# Patient Record
Sex: MALE | Race: WHITE | NOT HISPANIC OR LATINO
[De-identification: names, ages, dates, MRNs, and addresses within clinical notes are randomized per-mention and may not be internally consistent; named-entity substitution may affect disease eponyms.]

---

## 2017-01-31 ENCOUNTER — APPOINTMENT (OUTPATIENT)
Dept: ORTHOPEDIC SURGERY | Facility: CLINIC | Age: 82
End: 2017-01-31

## 2017-01-31 VITALS — BODY MASS INDEX: 29.62 KG/M2 | HEIGHT: 69 IN | WEIGHT: 200 LBS

## 2017-02-03 RX ORDER — HYALURONATE SODIUM 20 MG/2 ML
20 SYRINGE (ML) INTRAARTICULAR
Refills: 0 | Status: COMPLETED | OUTPATIENT
Start: 2017-02-03

## 2017-02-03 RX ADMIN — Medication 2 MG/2ML: at 00:00

## 2017-02-07 ENCOUNTER — APPOINTMENT (OUTPATIENT)
Dept: ORTHOPEDIC SURGERY | Facility: CLINIC | Age: 82
End: 2017-02-07

## 2017-02-07 VITALS — BODY MASS INDEX: 29.62 KG/M2 | WEIGHT: 200 LBS | HEIGHT: 69 IN

## 2017-02-10 RX ORDER — HYALURONATE SODIUM 20 MG/2 ML
20 SYRINGE (ML) INTRAARTICULAR
Refills: 0 | Status: COMPLETED | OUTPATIENT
Start: 2017-02-10

## 2017-02-10 RX ADMIN — Medication 2 MG/2ML: at 00:00

## 2017-02-15 ENCOUNTER — APPOINTMENT (OUTPATIENT)
Dept: ORTHOPEDIC SURGERY | Facility: CLINIC | Age: 82
End: 2017-02-15

## 2017-02-15 DIAGNOSIS — M12.811 OTHER SPECIFIC ARTHROPATHIES, NOT ELSEWHERE CLASSIFIED, RIGHT SHOULDER: ICD-10-CM

## 2017-02-15 RX ORDER — HYALURONATE SODIUM 20 MG/2 ML
20 SYRINGE (ML) INTRAARTICULAR
Refills: 0 | Status: COMPLETED | OUTPATIENT
Start: 2017-02-15

## 2017-02-15 RX ADMIN — Medication 2 MG/2ML: at 00:00

## 2017-03-20 ENCOUNTER — APPOINTMENT (OUTPATIENT)
Dept: ORTHOPEDIC SURGERY | Facility: CLINIC | Age: 82
End: 2017-03-20

## 2017-04-12 ENCOUNTER — APPOINTMENT (OUTPATIENT)
Dept: ORTHOPEDIC SURGERY | Facility: CLINIC | Age: 82
End: 2017-04-12

## 2017-04-12 VITALS
BODY MASS INDEX: 29.62 KG/M2 | DIASTOLIC BLOOD PRESSURE: 79 MMHG | SYSTOLIC BLOOD PRESSURE: 125 MMHG | HEIGHT: 69 IN | HEART RATE: 70 BPM | WEIGHT: 200 LBS

## 2017-04-18 RX ORDER — METHYLPRED ACET/NACL,ISO-OS/PF 40 MG/ML
40 VIAL (ML) INJECTION
Qty: 1 | Refills: 0 | Status: COMPLETED | OUTPATIENT
Start: 2017-04-18

## 2017-04-18 RX ORDER — LIDOCAINE HYDROCHLORIDE 10 MG/ML
1 INJECTION, SOLUTION INFILTRATION; PERINEURAL
Refills: 0 | Status: COMPLETED | OUTPATIENT
Start: 2017-04-18

## 2017-04-18 RX ADMIN — LIDOCAINE HYDROCHLORIDE %: 10 INJECTION, SOLUTION INFILTRATION; PERINEURAL at 00:00

## 2017-04-18 RX ADMIN — METHYLPREDNISOLONE ACETATE MG/ML: 40 INJECTION, SUSPENSION INTRA-ARTICULAR; INTRALESIONAL; INTRAMUSCULAR; SOFT TISSUE at 00:00

## 2017-09-06 ENCOUNTER — APPOINTMENT (OUTPATIENT)
Dept: ORTHOPEDIC SURGERY | Facility: CLINIC | Age: 82
End: 2017-09-06
Payer: MEDICARE

## 2017-09-06 DIAGNOSIS — M17.12 UNILATERAL PRIMARY OSTEOARTHRITIS, LEFT KNEE: ICD-10-CM

## 2017-09-06 DIAGNOSIS — M17.11 UNILATERAL PRIMARY OSTEOARTHRITIS, RIGHT KNEE: ICD-10-CM

## 2017-09-06 PROCEDURE — 20610 DRAIN/INJ JOINT/BURSA W/O US: CPT | Mod: 50

## 2017-09-06 PROCEDURE — 99214 OFFICE O/P EST MOD 30 MIN: CPT | Mod: 25

## 2017-09-06 RX ORDER — METHYLPRED ACET/NACL,ISO-OS/PF 40 MG/ML
40 VIAL (ML) INJECTION
Qty: 1 | Refills: 0 | Status: COMPLETED | OUTPATIENT
Start: 2017-09-06

## 2017-09-06 RX ORDER — LIDOCAINE HYDROCHLORIDE 10 MG/ML
1 INJECTION, SOLUTION INFILTRATION; PERINEURAL
Refills: 0 | Status: COMPLETED | OUTPATIENT
Start: 2017-09-06

## 2017-09-06 RX ADMIN — METHYLPREDNISOLONE ACETATE 2 MG/ML: 40 INJECTION, SUSPENSION INTRALESIONAL; INTRAMUSCULAR; INTRASYNOVIAL; SOFT TISSUE at 00:00

## 2017-09-06 RX ADMIN — LIDOCAINE HYDROCHLORIDE 3 %: 10 INJECTION, SOLUTION EPIDURAL; INFILTRATION; INTRACAUDAL; PERINEURAL at 00:00

## 2017-09-11 PROBLEM — M17.11 PRIMARY OSTEOARTHRITIS OF RIGHT KNEE: Status: ACTIVE | Noted: 2017-09-11

## 2019-02-05 ENCOUNTER — APPOINTMENT (OUTPATIENT)
Dept: ORTHOPEDIC SURGERY | Facility: CLINIC | Age: 84
End: 2019-02-05
Payer: MEDICARE

## 2019-02-05 VITALS
HEART RATE: 89 BPM | SYSTOLIC BLOOD PRESSURE: 138 MMHG | WEIGHT: 200 LBS | DIASTOLIC BLOOD PRESSURE: 79 MMHG | HEIGHT: 69 IN | BODY MASS INDEX: 29.62 KG/M2

## 2019-02-05 DIAGNOSIS — M70.21 OLECRANON BURSITIS, RIGHT ELBOW: ICD-10-CM

## 2019-02-05 PROCEDURE — 73080 X-RAY EXAM OF ELBOW: CPT | Mod: RT

## 2019-02-05 PROCEDURE — 99215 OFFICE O/P EST HI 40 MIN: CPT

## 2021-12-24 ENCOUNTER — EMERGENCY (EMERGENCY)
Facility: HOSPITAL | Age: 86
LOS: 1 days | Discharge: HOME | End: 2021-12-24
Attending: EMERGENCY MEDICINE
Payer: MEDICARE

## 2021-12-24 VITALS
HEIGHT: 68 IN | RESPIRATION RATE: 18 BRPM | SYSTOLIC BLOOD PRESSURE: 146 MMHG | OXYGEN SATURATION: 94 % | HEART RATE: 80 BPM | WEIGHT: 190.04 LBS | TEMPERATURE: 98 F | DIASTOLIC BLOOD PRESSURE: 84 MMHG

## 2021-12-24 PROCEDURE — 71045 X-RAY EXAM CHEST 1 VIEW: CPT | Mod: 26

## 2021-12-24 PROCEDURE — 99284 EMERGENCY DEPT VISIT MOD MDM: CPT | Mod: 25

## 2021-12-24 PROCEDURE — 70450 CT HEAD/BRAIN W/O DYE: CPT | Mod: MA

## 2021-12-24 PROCEDURE — 71045 X-RAY EXAM CHEST 1 VIEW: CPT

## 2021-12-24 PROCEDURE — 99284 EMERGENCY DEPT VISIT MOD MDM: CPT | Mod: GC

## 2021-12-24 PROCEDURE — 72170 X-RAY EXAM OF PELVIS: CPT

## 2021-12-24 PROCEDURE — 72125 CT NECK SPINE W/O DYE: CPT | Mod: 26,MA

## 2021-12-24 PROCEDURE — 70450 CT HEAD/BRAIN W/O DYE: CPT | Mod: 26,MA

## 2021-12-24 PROCEDURE — 72170 X-RAY EXAM OF PELVIS: CPT | Mod: 26

## 2021-12-24 PROCEDURE — 72125 CT NECK SPINE W/O DYE: CPT | Mod: MA

## 2021-12-24 NOTE — ED PROVIDER NOTE - NS ED ROS FT
Review of Systems    Constitutional: (-) fever, (-) chills, (-) fatigue  Cardiovascular: (-) chest pain, (-) syncope  Respiratory: (-) cough, (-) shortness of breath  Gastrointestinal: (-) vomiting, (-) diarrhea, (-) abdominal pain  Musculoskeletal: (-) neck pain, (-) back pain, (-) joint pain  Integumentary: (-) rash, (-) edema, (-) wound  Neurological: (+) headache, (-) altered mental status    Except as documented in the HPI, all other systems are negative.

## 2021-12-24 NOTE — ED PROVIDER NOTE - WET READ LAUNCH FT
Checked with [facility] and Dr. Nichol Cruz is not in network.  Patient will have to see in .     Thank you,  [facility] There are no Wet Read(s) to document. There is 1 Wet Read(s) to document.

## 2021-12-24 NOTE — ED ADULT NURSE NOTE - NSIMPLEMENTINTERV_GEN_ALL_ED
Implemented All Fall with Harm Risk Interventions:  [place] to call system. Call bell, personal items and telephone within reach. Instruct patient to call for assistance. Room bathroom lighting operational. Non-slip footwear when patient is off stretcher. Physically safe environment: no spills, clutter or unnecessary equipment. Stretcher in lowest position, wheels locked, appropriate side rails in place. Provide visual cue, wrist band, yellow gown, etc. Monitor gait and stability. Monitor for mental status changes and reorient to person, place, and time. Review medications for side effects contributing to fall risk. Reinforce activity limits and safety measures with patient and family. Provide visual clues: red socks.

## 2021-12-24 NOTE — ED ADULT NURSE NOTE - OBJECTIVE STATEMENT
89 y old male with PMH of hypothyroidism, glaucoma and HTN presents to the ED from assisted living s/p mechanical trip and fall. Pt reports he was wa 89 y old male with PMH of hypothyroidism, glaucoma and HTN presents to the ED from assisted living s/p mechanical trip and fall. Pt reports he was walking backwards  out of bathroom when he tripped and fell. Pt reports he hit his head but denies LOC. Pt denies use of AC. Denies HA, fevers, chills, CP, SOB, abd pain, n/v/d, weakness. Pt A&O x 4, ambulatory at baseline. Respirations spontaneous and unlabored. Abdomen soft, nontender and nondistended. Peripheral pulses strong. Skin warm, dry and intact. Bed in lowest position, side rails up and call bell in reach.

## 2021-12-24 NOTE — ED PROVIDER NOTE - ATTENDING CONTRIBUTION TO CARE
Mechanical trip and fall earlier this evening, not on AC, neuro intact, no evidence of significant skull injury on exam, no other pain complaints or point tenderness, will obtain imaging for screening for trauma given age and if negative discharge with outpatient follow up.

## 2021-12-24 NOTE — ED PROVIDER NOTE - CARE PLAN
1 Principal Discharge DX:	Headache due to injury of head and neck  Secondary Diagnosis:	Accident due to mechanical fall without injury, initial encounter

## 2021-12-24 NOTE — ED PROVIDER NOTE - CLINICAL SUMMARY MEDICAL DECISION MAKING FREE TEXT BOX
Jaylin-PGY3: 89 year old male with PMH HTN, hypothyroidism, glaucoma presents with headache s/p fall prior to arrival. Pt states he was walking backwards attempting to get out of shower, slipped, and fell backwards hitting his head. Denies LOC. Pt states he did not attempt to ambulate afterwards. Denies any aspirin or AC use. Neuro exam nonfocal, FROM of extremities. Pt declines pain medication at this time. Will obtain imaging r/o fx r/o ICH with dispo pending results.

## 2021-12-24 NOTE — ED PROVIDER NOTE - PROGRESS NOTE DETAILS
Jaylin-PGY3: imaging showing no acute pathology. Will DC with PMD follow up and return precautions. Pending non-emergent transport.

## 2021-12-24 NOTE — ED ADULT TRIAGE NOTE - ARRIVAL FROM
the [facility] cutter [facility] living [facility]

## 2021-12-24 NOTE — ED PROVIDER NOTE - PATIENT PORTAL LINK FT
You can access the FollowMyHealth Patient Portal offered by [facility] by registering at the following website: [email or web address]. By joining [employer]’s FollowMyHealth portal, you will also be able to view your health information using other applications (apps) compatible with our system.

## 2021-12-24 NOTE — ED PROVIDER NOTE - OBJECTIVE STATEMENT
89 year old male with PMH HTN, hypothyroidism, glaucoma presents with headache s/p fall prior to arrival. Pt states he was walking backwards attempting to get out of shower, slipped, and fell backwards hitting his head. Denies LOC. Pt states he did not attempt to ambulate afterwards. Denies any aspirin or AC use. Denies any fevers, chest pain, shortness of breath, abdominal pain, vomiting, vision change, numbness, weakness, or rash. Denies any additional complaints.

## 2021-12-24 NOTE — ED PROVIDER NOTE - PHYSICAL EXAMINATION
CONSTITUTIONAL: non-toxic, well appearing  SKIN: no rash, no petechiae.  EYES: PERRL, EOMI, pink conjunctiva, anicteric  ENT: tongue and uvular midline, no exudates, moist mucous membranes  NECK: Supple; no meningismus, no JVD  CARD: RRR, no murmurs, equal radial pulses bilaterally 2+  RESP: CTAB, no respiratory distress  ABD: Soft, non-tender, non-distended, no peritoneal signs  SPINE: no midline bony tenderness, pelvis stable  EXT: Normal ROM x4. No edema.   NEURO: Alert, oriented. Neuro exam nonfocal, equal strength bilaterally  PSYCH: Cooperative, appropriate.

## 2021-12-24 NOTE — ED PROVIDER NOTE - NSFOLLOWUPINSTRUCTIONS_ED_ALL_ED_FT
Rest and stay well hydrated.    Follow up with your primary care physician in 1-3 days.     Take over the counter acetaminophen (Tylenol) 650-1000 mg every 4-6 hours as needed for pain. Do not take more than 3000 mg in a 24 hour period. Be aware many over the counter and prescription medications also contain acetaminophen (Tylenol).     Return immediately with any new or worsening symptoms including fevers, chest pain, shortness of breath, numbness, weakness, or any additional concerns.

## 2021-12-25 VITALS
OXYGEN SATURATION: 94 % | DIASTOLIC BLOOD PRESSURE: 83 MMHG | TEMPERATURE: 98 F | RESPIRATION RATE: 17 BRPM | HEART RATE: 80 BPM | SYSTOLIC BLOOD PRESSURE: 155 MMHG

## 2022-03-22 ENCOUNTER — INPATIENT (INPATIENT)
Facility: HOSPITAL | Age: 87
LOS: 4 days | Discharge: NURSING, REHAB OR LONG-TERM CARE | DRG: 310 | End: 2022-03-27
Attending: HOSPITALIST | Admitting: STUDENT IN AN ORGANIZED HEALTH CARE EDUCATION/TRAINING PROGRAM
Payer: MEDICARE

## 2022-03-22 VITALS
DIASTOLIC BLOOD PRESSURE: 72 MMHG | RESPIRATION RATE: 18 BRPM | TEMPERATURE: 98 F | WEIGHT: 199.96 LBS | HEIGHT: 68 IN | HEART RATE: 80 BPM | SYSTOLIC BLOOD PRESSURE: 107 MMHG | OXYGEN SATURATION: 94 %

## 2022-03-22 LAB
ALBUMIN SERPL ELPH-MCNC: 4.3 G/DL — SIGNIFICANT CHANGE UP (ref 3.3–5)
ALP SERPL-CCNC: 69 U/L — SIGNIFICANT CHANGE UP (ref 40–120)
ALT FLD-CCNC: 13 U/L — SIGNIFICANT CHANGE UP (ref 10–45)
ANION GAP SERPL CALC-SCNC: 12 MMOL/L — SIGNIFICANT CHANGE UP (ref 5–17)
APTT BLD: 26.5 SEC — LOW (ref 27.5–35.5)
AST SERPL-CCNC: 17 U/L — SIGNIFICANT CHANGE UP (ref 10–40)
BASE EXCESS BLDV CALC-SCNC: 2 MMOL/L — SIGNIFICANT CHANGE UP (ref -2–2)
BASOPHILS # BLD AUTO: 0.03 K/UL — SIGNIFICANT CHANGE UP (ref 0–0.2)
BASOPHILS NFR BLD AUTO: 0.3 % — SIGNIFICANT CHANGE UP (ref 0–2)
BILIRUB SERPL-MCNC: 0.4 MG/DL — SIGNIFICANT CHANGE UP (ref 0.2–1.2)
BLOOD GAS VENOUS - CREATININE: SIGNIFICANT CHANGE UP MG/DL (ref 0.5–1.3)
BUN SERPL-MCNC: 21 MG/DL — SIGNIFICANT CHANGE UP (ref 7–23)
CA-I SERPL-SCNC: 1.2 MMOL/L — SIGNIFICANT CHANGE UP (ref 1.15–1.33)
CALCIUM SERPL-MCNC: 9.1 MG/DL — SIGNIFICANT CHANGE UP (ref 8.4–10.5)
CHLORIDE BLDV-SCNC: 102 MMOL/L — SIGNIFICANT CHANGE UP (ref 96–108)
CHLORIDE SERPL-SCNC: 104 MMOL/L — SIGNIFICANT CHANGE UP (ref 96–108)
CO2 BLDV-SCNC: 29 MMOL/L — HIGH (ref 22–26)
CO2 SERPL-SCNC: 25 MMOL/L — SIGNIFICANT CHANGE UP (ref 22–31)
CREAT SERPL-MCNC: 0.93 MG/DL — SIGNIFICANT CHANGE UP (ref 0.5–1.3)
EGFR: 78 ML/MIN/1.73M2 — SIGNIFICANT CHANGE UP
EOSINOPHIL # BLD AUTO: 0.19 K/UL — SIGNIFICANT CHANGE UP (ref 0–0.5)
EOSINOPHIL NFR BLD AUTO: 1.9 % — SIGNIFICANT CHANGE UP (ref 0–6)
GAS PNL BLDV: 137 MMOL/L — SIGNIFICANT CHANGE UP (ref 136–145)
GAS PNL BLDV: SIGNIFICANT CHANGE UP
GAS PNL BLDV: SIGNIFICANT CHANGE UP
GLUCOSE BLDV-MCNC: 138 MG/DL — HIGH (ref 70–99)
GLUCOSE SERPL-MCNC: 142 MG/DL — HIGH (ref 70–99)
HCO3 BLDV-SCNC: 28 MMOL/L — SIGNIFICANT CHANGE UP (ref 22–29)
HCT VFR BLD CALC: 40 % — SIGNIFICANT CHANGE UP (ref 39–50)
HCT VFR BLDA CALC: 41 % — SIGNIFICANT CHANGE UP (ref 39–51)
HGB BLD CALC-MCNC: 13.8 G/DL — SIGNIFICANT CHANGE UP (ref 12.6–17.4)
HGB BLD-MCNC: 13.4 G/DL — SIGNIFICANT CHANGE UP (ref 13–17)
IMM GRANULOCYTES NFR BLD AUTO: 0.6 % — SIGNIFICANT CHANGE UP (ref 0–1.5)
INR BLD: 1.04 RATIO — SIGNIFICANT CHANGE UP (ref 0.88–1.16)
LACTATE BLDV-MCNC: 2 MMOL/L — SIGNIFICANT CHANGE UP (ref 0.7–2)
LIDOCAIN IGE QN: 64 U/L — HIGH (ref 7–60)
LYMPHOCYTES # BLD AUTO: 1.07 K/UL — SIGNIFICANT CHANGE UP (ref 1–3.3)
LYMPHOCYTES # BLD AUTO: 10.6 % — LOW (ref 13–44)
MCHC RBC-ENTMCNC: 29.4 PG — SIGNIFICANT CHANGE UP (ref 27–34)
MCHC RBC-ENTMCNC: 33.5 GM/DL — SIGNIFICANT CHANGE UP (ref 32–36)
MCV RBC AUTO: 87.7 FL — SIGNIFICANT CHANGE UP (ref 80–100)
MONOCYTES # BLD AUTO: 0.59 K/UL — SIGNIFICANT CHANGE UP (ref 0–0.9)
MONOCYTES NFR BLD AUTO: 5.9 % — SIGNIFICANT CHANGE UP (ref 2–14)
NEUTROPHILS # BLD AUTO: 8.14 K/UL — HIGH (ref 1.8–7.4)
NEUTROPHILS NFR BLD AUTO: 80.7 % — HIGH (ref 43–77)
NRBC # BLD: 0 /100 WBCS — SIGNIFICANT CHANGE UP (ref 0–0)
PCO2 BLDV: 47 MMHG — SIGNIFICANT CHANGE UP (ref 42–55)
PH BLDV: 7.38 — SIGNIFICANT CHANGE UP (ref 7.32–7.43)
PLATELET # BLD AUTO: 181 K/UL — SIGNIFICANT CHANGE UP (ref 150–400)
PO2 BLDV: 42 MMHG — SIGNIFICANT CHANGE UP (ref 25–45)
POTASSIUM BLDV-SCNC: 3.9 MMOL/L — SIGNIFICANT CHANGE UP (ref 3.5–5.1)
POTASSIUM SERPL-MCNC: 4 MMOL/L — SIGNIFICANT CHANGE UP (ref 3.5–5.3)
POTASSIUM SERPL-SCNC: 4 MMOL/L — SIGNIFICANT CHANGE UP (ref 3.5–5.3)
PROT SERPL-MCNC: 6.4 G/DL — SIGNIFICANT CHANGE UP (ref 6–8.3)
PROTHROM AB SERPL-ACNC: 12 SEC — SIGNIFICANT CHANGE UP (ref 10.5–13.4)
RBC # BLD: 4.56 M/UL — SIGNIFICANT CHANGE UP (ref 4.2–5.8)
RBC # FLD: 15.3 % — HIGH (ref 10.3–14.5)
SAO2 % BLDV: 71 % — SIGNIFICANT CHANGE UP (ref 67–88)
SODIUM SERPL-SCNC: 141 MMOL/L — SIGNIFICANT CHANGE UP (ref 135–145)
TROPONIN T, HIGH SENSITIVITY RESULT: 28 NG/L — SIGNIFICANT CHANGE UP (ref 0–51)
WBC # BLD: 10.08 K/UL — SIGNIFICANT CHANGE UP (ref 3.8–10.5)
WBC # FLD AUTO: 10.08 K/UL — SIGNIFICANT CHANGE UP (ref 3.8–10.5)

## 2022-03-22 PROCEDURE — 73060 X-RAY EXAM OF HUMERUS: CPT | Mod: 26,LT

## 2022-03-22 PROCEDURE — 73030 X-RAY EXAM OF SHOULDER: CPT | Mod: 26,LT

## 2022-03-22 PROCEDURE — 73551 X-RAY EXAM OF FEMUR 1: CPT | Mod: 26,LT

## 2022-03-22 PROCEDURE — 73070 X-RAY EXAM OF ELBOW: CPT | Mod: 26,LT

## 2022-03-22 PROCEDURE — 73090 X-RAY EXAM OF FOREARM: CPT | Mod: 26,LT

## 2022-03-22 PROCEDURE — 71045 X-RAY EXAM CHEST 1 VIEW: CPT | Mod: 26

## 2022-03-22 PROCEDURE — 73502 X-RAY EXAM HIP UNI 2-3 VIEWS: CPT | Mod: 26,LT

## 2022-03-22 PROCEDURE — 99285 EMERGENCY DEPT VISIT HI MDM: CPT | Mod: GC

## 2022-03-22 PROCEDURE — 93010 ELECTROCARDIOGRAM REPORT: CPT

## 2022-03-22 RX ORDER — TETANUS TOXOID, REDUCED DIPHTHERIA TOXOID AND ACELLULAR PERTUSSIS VACCINE, ADSORBED 5; 2.5; 8; 8; 2.5 [IU]/.5ML; [IU]/.5ML; UG/.5ML; UG/.5ML; UG/.5ML
0.5 SUSPENSION INTRAMUSCULAR ONCE
Refills: 0 | Status: COMPLETED | OUTPATIENT
Start: 2022-03-22 | End: 2022-03-22

## 2022-03-22 RX ORDER — SODIUM CHLORIDE 9 MG/ML
250 INJECTION INTRAMUSCULAR; INTRAVENOUS; SUBCUTANEOUS ONCE
Refills: 0 | Status: COMPLETED | OUTPATIENT
Start: 2022-03-22 | End: 2022-03-22

## 2022-03-22 RX ADMIN — TETANUS TOXOID, REDUCED DIPHTHERIA TOXOID AND ACELLULAR PERTUSSIS VACCINE, ADSORBED 0.5 MILLILITER(S): 5; 2.5; 8; 8; 2.5 SUSPENSION INTRAMUSCULAR at 22:39

## 2022-03-22 RX ADMIN — SODIUM CHLORIDE 250 MILLILITER(S): 9 INJECTION INTRAMUSCULAR; INTRAVENOUS; SUBCUTANEOUS at 22:10

## 2022-03-22 NOTE — ED ADULT NURSE NOTE - OBJECTIVE STATEMENT
89 y male presents from [facility] s/p mechanical fall while in bathroom. patient reports to using bathroom- went to clean himself- fell forward/ hit left side of head. not on ac- no loc. GCS 15. a&ox3- placed in cervical collar by ems. denies vision change, HA, n/v/d, cp, sob, fevers, chills, lightheadedness, dizziness. reports to left sided hip and shoulder pain- chronic LUE drift. full rom to b/l lower extremities with full sensation. abrasions noted to b/l knees and left shoulder. placed on cardiac monitor- a. fib in 70s noted. saturating 92-95% on RA; md vaca aware. seen by break coverage rn- report given to lubna irvin. vss. awaiting labs radiology and dispo.

## 2022-03-22 NOTE — ED PROVIDER NOTE - NS ED ROS FT
Gen: No F/C/NS  Head: +falls, +head trauma  Eyes: No changes in vision   Resp: No cough or trouble breathing  Cardiovascular: No chest pain or palpitation  Gastroenteric: No N/V, +diarrhea   :  No change in urine output, dysuria or hematuria   MS: +joint pain   Neuro: No headache   Skin: No new rash

## 2022-03-22 NOTE — ED ADULT NURSE NOTE - CHIEF COMPLAINT QUOTE
slipped and fell when getting off the toilet. pain on left side of body from fall. weaker on left [id number]. in c-collar from EMS   denies blood thinner. unsure of LOC

## 2022-03-22 NOTE — ED PROVIDER NOTE - ATTENDING CONTRIBUTION TO CARE
I, Johanne Saunders, performed a history and physical exam of the patient and discussed their management with the resident. I reviewed the resident's note and agree with the documented findings and plan of care except where otherwise noted in my note. I was present and available for all procedures.     89 yo M pmh bph, hld, htn, copd, hypothyroidism p/w mechanical fall with L sided HA and L shoulder and L hip pain. States he slipped on a bowel movement he had while. No preceding chest pain, sob, palpitations, dizziness. No AC.  in the field. Noted to be in afib on monitor (no prior history).     On exam, awake, alert, in c-collar. PERRL  airway patent  breath sounds equal bilaterally  rub noted on cardiac exam, regular rate, irregular rhythm  no midline c/t/l/s spine tenderness, no posterior back lesions   no gross deformities of b/l LE, no TTP of L hip  +DP pulses b/l   L shoulder with decreased ROM, no gross deformities  Tenderness to L elbow, able to flex and extend  no focal neuro deficits.  abdomen soft, lower abdominal tenderness to palpation with guarding  no anterior chest wall tenderness or lesions     Will get trauma survery to eval for acute injury but also will get EKG to eval for new afib as well as trop/cardiac momitor to eval for cardiac etiology of fall such as arrhythmia. CT head to eval for intracranial cause of fall or traumatic injury post fall. Dispo pending.     will get

## 2022-03-22 NOTE — ED ADULT NURSE NOTE - BRAND OF COVID-19 VACCINATION
Pt discharging home today, spouse to provide transportation home.    Home health arrangements complete with pt and spouse choice of [facility]. CM confirmed acceptance with [facility].    Pt has orders for a GI referral, CM called [facility], they will make the referral and schedule the GI apt for pt.    No additional CM needs or barriers for discharge.     12/02/19 1224   Final Note   Assessment Type Final Discharge Note   Anticipated Discharge Disposition Home-Health   What phone number can be called within the next 1-3 days to see how you are doing after discharge? [phone]   Hospital Follow Up  Appt(s) scheduled? Yes   Discharge plans and expectations educations in teach back method with documentation complete? Yes   Right Care Referral Info   Post Acute Recommendation Home-care      [employer] dose 1 and 2

## 2022-03-22 NOTE — ED PROVIDER NOTE - PHYSICAL EXAMINATION
Head: NCAT  EENT: No facial swelling, tenderness, or bruising.    Neck: Ccollar in place. No midline tenderness.    Chest: No abrasions/tenderness  Cardiac: RRR   Resp: Bilateral breath sounds, no tachypnea  Abd: Nontender, no rebound or guarding.  Pelvis: Stable  MSK: FROM all extremities. No tenderness  Skin: No calf tenderness, no LE edema, abrasions noted to BL knee and L elbow. FROM of all joints.

## 2022-03-22 NOTE — ED PROVIDER NOTE - CLINICAL SUMMARY MEDICAL DECISION MAKING FREE TEXT BOX
89yo M PMH HTN, glaucoma, COPD (baseline O2 90-92%), hypothyroidism presents after a fall. Plan to obtain trauma scan, labs, urine, reassess. New onset Afib on ECG will likely admit for cardiology f/u.

## 2022-03-22 NOTE — ED ADULT TRIAGE NOTE - CHIEF COMPLAINT QUOTE
slipped and fell when getting off the toilet. pain on left side of body from fall. weaker on left [id number]. in c-collar from EMS   denies blood thinner. unsure of LOC

## 2022-03-22 NOTE — ED PROVIDER NOTE - OBJECTIVE STATEMENT
89yo M PMH HTN, glaucoma, COPD (baseline O2 90-92%), hypothyroidism presents after a fall. States he had a BM 1 hr PTA, after which he slipped on the bathroom floor and fell forward. +Head trauma. Pain to L shoulder, L elbow, L hand and L hip. Denies LOC. Denies dizziness, lightheadedness, nausea, vomiting prior to event. Patient denies chest pain or discomfort, shortness of breath, diaphoresis.

## 2022-03-23 DIAGNOSIS — Z98.890 OTHER SPECIFIED POSTPROCEDURAL STATES: Chronic | ICD-10-CM
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
## 2022-03-23 DIAGNOSIS — Z29.9 ENCOUNTER FOR PROPHYLACTIC MEASURES, UNSPECIFIED: ICD-10-CM

## 2022-03-23 DIAGNOSIS — E78.5 HYPERLIPIDEMIA, UNSPECIFIED: ICD-10-CM

## 2022-03-23 DIAGNOSIS — I48.91 UNSPECIFIED ATRIAL FIBRILLATION: ICD-10-CM

## 2022-03-23 DIAGNOSIS — D49.89 NEOPLASM OF UNSPECIFIED BEHAVIOR OF OTHER SPECIFIED SITES: ICD-10-CM

## 2022-03-23 DIAGNOSIS — E03.9 HYPOTHYROIDISM, UNSPECIFIED: ICD-10-CM

## 2022-03-23 PROBLEM — H40.9 UNSPECIFIED GLAUCOMA: Chronic | Status: ACTIVE | Noted: 2021-12-24

## 2022-03-23 PROBLEM — I10 ESSENTIAL (PRIMARY) HYPERTENSION: Chronic | Status: ACTIVE | Noted: 2021-12-24

## 2022-03-23 LAB
APPEARANCE UR: CLEAR — SIGNIFICANT CHANGE UP
BILIRUB UR-MCNC: NEGATIVE — SIGNIFICANT CHANGE UP
COLOR SPEC: SIGNIFICANT CHANGE UP
DIFF PNL FLD: NEGATIVE — SIGNIFICANT CHANGE UP
GLUCOSE UR QL: NEGATIVE — SIGNIFICANT CHANGE UP
KETONES UR-MCNC: NEGATIVE — SIGNIFICANT CHANGE UP
LEUKOCYTE ESTERASE UR-ACNC: NEGATIVE — SIGNIFICANT CHANGE UP
NITRITE UR-MCNC: NEGATIVE — SIGNIFICANT CHANGE UP
PH UR: 6 — SIGNIFICANT CHANGE UP (ref 5–8)
PROT UR-MCNC: SIGNIFICANT CHANGE UP
SARS-COV-2 RNA SPEC QL NAA+PROBE: SIGNIFICANT CHANGE UP
SP GR SPEC: 1.05 — HIGH (ref 1.01–1.02)
TROPONIN T, HIGH SENSITIVITY RESULT: 29 NG/L — SIGNIFICANT CHANGE UP (ref 0–51)
UROBILINOGEN FLD QL: NEGATIVE — SIGNIFICANT CHANGE UP

## 2022-03-23 PROCEDURE — 72125 CT NECK SPINE W/O DYE: CPT | Mod: 26,MA

## 2022-03-23 PROCEDURE — 99223 1ST HOSP IP/OBS HIGH 75: CPT

## 2022-03-23 PROCEDURE — 74177 CT ABD & PELVIS W/CONTRAST: CPT | Mod: 26,MA

## 2022-03-23 PROCEDURE — 93306 TTE W/DOPPLER COMPLETE: CPT | Mod: 26

## 2022-03-23 PROCEDURE — 71260 CT THORAX DX C+: CPT | Mod: 26,MA

## 2022-03-23 PROCEDURE — 70450 CT HEAD/BRAIN W/O DYE: CPT | Mod: 26,MA

## 2022-03-23 RX ORDER — MIRABEGRON 50 MG/1
1 TABLET, EXTENDED RELEASE ORAL
Qty: 0 | Refills: 0 | DISCHARGE

## 2022-03-23 RX ORDER — FINASTERIDE 5 MG/1
5 TABLET, FILM COATED ORAL DAILY
Refills: 0 | Status: DISCONTINUED | OUTPATIENT
Start: 2022-03-23 | End: 2022-03-27

## 2022-03-23 RX ORDER — METOPROLOL TARTRATE 50 MG
0.5 TABLET ORAL
Qty: 0 | Refills: 0 | DISCHARGE

## 2022-03-23 RX ORDER — METOPROLOL TARTRATE 50 MG
25 TABLET ORAL
Refills: 0 | Status: DISCONTINUED | OUTPATIENT
Start: 2022-03-23 | End: 2022-03-27

## 2022-03-23 RX ORDER — TAMSULOSIN HYDROCHLORIDE 0.4 MG/1
0.4 CAPSULE ORAL AT BEDTIME
Refills: 0 | Status: DISCONTINUED | OUTPATIENT
Start: 2022-03-23 | End: 2022-03-27

## 2022-03-23 RX ORDER — ACETAMINOPHEN 500 MG
650 TABLET ORAL EVERY 6 HOURS
Refills: 0 | Status: DISCONTINUED | OUTPATIENT
Start: 2022-03-23 | End: 2022-03-27

## 2022-03-23 RX ORDER — ATORVASTATIN CALCIUM 80 MG/1
40 TABLET, FILM COATED ORAL AT BEDTIME
Refills: 0 | Status: DISCONTINUED | OUTPATIENT
Start: 2022-03-23 | End: 2022-03-27

## 2022-03-23 RX ORDER — LATANOPROST 0.05 MG/ML
1 SOLUTION/ DROPS OPHTHALMIC; TOPICAL AT BEDTIME
Refills: 0 | Status: DISCONTINUED | OUTPATIENT
Start: 2022-03-23 | End: 2022-03-27

## 2022-03-23 RX ORDER — BIMATOPROST 0.3 MG/ML
1 SOLUTION/ DROPS OPHTHALMIC
Qty: 0 | Refills: 0 | DISCHARGE

## 2022-03-23 RX ORDER — LEVOTHYROXINE SODIUM 125 MCG
100 TABLET ORAL DAILY
Refills: 0 | Status: DISCONTINUED | OUTPATIENT
Start: 2022-03-23 | End: 2022-03-27

## 2022-03-23 RX ORDER — ENOXAPARIN SODIUM 100 MG/ML
40 INJECTION SUBCUTANEOUS EVERY 24 HOURS
Refills: 0 | Status: DISCONTINUED | OUTPATIENT
Start: 2022-03-23 | End: 2022-03-27

## 2022-03-23 RX ADMIN — TAMSULOSIN HYDROCHLORIDE 0.4 MILLIGRAM(S): 0.4 CAPSULE ORAL at 23:23

## 2022-03-23 RX ADMIN — ATORVASTATIN CALCIUM 40 MILLIGRAM(S): 80 TABLET, FILM COATED ORAL at 21:52

## 2022-03-23 RX ADMIN — ENOXAPARIN SODIUM 40 MILLIGRAM(S): 100 INJECTION SUBCUTANEOUS at 13:44

## 2022-03-23 RX ADMIN — LATANOPROST 1 DROP(S): 0.05 SOLUTION/ DROPS OPHTHALMIC; TOPICAL at 21:53

## 2022-03-23 RX ADMIN — Medication 25 MILLIGRAM(S): at 17:16

## 2022-03-23 RX ADMIN — FINASTERIDE 5 MILLIGRAM(S): 5 TABLET, FILM COATED ORAL at 13:44

## 2022-03-23 NOTE — H&P ADULT - PROBLEM SELECTOR PLAN 1
-cw metop 25 bid, appears rate controlled  -monitor on tele  -TTE pending  -outpt cardiologist Panchito Concepcion ([facility]) [phone]  -discussed AC w/ daughter risks/benefits of bleeding vs CVA. She prefers to hold off on AC given age and prior issues w/ bleeding/bruising on ASA

## 2022-03-23 NOTE — H&P ADULT - NSHPPHYSICALEXAM_GEN_ALL_CORE
Vital Signs Last 24 Hrs  T(C): 36.6 (23 Mar 2022 10:55), Max: 37 (22 Mar 2022 21:55)  T(F): 97.8 (23 Mar 2022 10:55), Max: 98.6 (22 Mar 2022 21:55)  HR: 84 (23 Mar 2022 10:55) (78 - 91)  BP: 154/74 (23 Mar 2022 10:55) (107/72 - 158/88)  BP(mean): --  RR: 18 (23 Mar 2022 10:55) (18 - 20)  SpO2: 95% (23 Mar 2022 10:55) (92% - 97%)    PHYSICAL EXAM:  CONSTITUTIONAL: NAD, well-developed, well-groomed  EYES: PERRLA; conjunctiva and sclera clear +exopthalmus, +mild L eye discharge  ENMT: Moist oral mucosa, no pharyngeal injection or exudates; normal dentition  NECK: Supple, no palpable masses; no thyromegaly  RESPIRATORY: Normal respiratory effort; lungs are clear to auscultation bilaterally  CARDIOVASCULAR: irregular Regular rate and rhythm, normal S1 and S2, +systolic murmur; No lower extremity edema; Peripheral pulses are 2+ bilaterally  ABDOMEN: Nontender to palpation, normoactive bowel sounds, no rebound/guarding; No hepatosplenomegaly  MUSCULOSKELETAL:  Normal gait; no clubbing or cyanosis of digits; TTP over L hip  PSYCH: A+O to person, place; affect appropriate  NEUROLOGY: CN 2-12 are intact and symmetric; no gross sensory deficits   SKIN: No rashes; no palpable lesions

## 2022-03-23 NOTE — PATIENT PROFILE ADULT - FALL HARM RISK - HARM RISK INTERVENTIONS

## 2022-03-23 NOTE — H&P ADULT - ASSESSMENT
89M w/ PMHx of HTN, ?CAD, HLD, goiter s/p resection now on thyroid replacement presents from the [facility] after a fall. Patient reports yesterday evening he was getting up from the bathroom, slipped and fell. Sounds mechanical, but may have been provoked in setting of new onset afib.

## 2022-03-23 NOTE — H&P ADULT - NSHPREVIEWOFSYSTEMS_GEN_ALL_CORE
Review of Systems:   CONSTITUTIONAL: No fever, weight loss  EYES: No eye pain, visual disturbances, or discharge  ENMT:  No difficulty hearing, tinnitus, vertigo; No sinus or throat pain  RESPIRATORY: No SOB. No cough, wheezing, chills or hemoptysis  CARDIOVASCULAR: No chest pain, palpitations, dizziness, or leg swelling  GASTROINTESTINAL: No abdominal or epigastric pain. No nausea, vomiting, or hematemesis; No diarrhea or constipation. No melena or hematochezia.  GENITOURINARY: No dysuria, frequency, hematuria, or incontinence  NEUROLOGICAL: No headaches, memory loss, loss of strength, numbness, or tremors  SKIN: No itching, burning, rashes, or lesions   LYMPH NODES: No enlarged glands  ENDOCRINE: No heat or cold intolerance; No hair loss  MUSCULOSKELETAL: No muscle, back pain +L hip pain  PSYCHIATRIC: No depression, anxiety, mood swings, or difficulty sleeping  HEME/LYMPH: No easy bruising, or bleeding gums

## 2022-03-23 NOTE — H&P ADULT - NSHPLABSRESULTS_GEN_ALL_CORE
LABS:                         13.4   10.08 )-----------( 181      ( 22 Mar 2022 22:15 )             40.0         141  |  104  |  21  ----------------------------<  142<H>  4.0   |  25  |  0.93    Ca    9.1      22 Mar 2022 22:15    TPro  6.4  /  Alb  4.3  /  TBili  0.4  /  DBili  x   /  AST  17  /  ALT  13  /  AlkPhos  69      PT/INR - ( 22 Mar 2022 22:15 )   PT: 12.0 sec;   INR: 1.04 ratio         PTT - ( 22 Mar 2022 22:15 )  PTT:26.5 sec      Urinalysis Basic - ( 23 Mar 2022 04:28 )    Color: Light Yellow / Appearance: Clear / S.048 / pH: x  Gluc: x / Ketone: Negative  / Bili: Negative / Urobili: Negative   Blood: x / Protein: Trace / Nitrite: Negative   Leuk Esterase: Negative / RBC: x / WBC x   Sq Epi: x / Non Sq Epi: x / Bacteria: x            Records reviewed from prior hospitalization.  Labs reviewed remarkable for neg trop  EKG personally reviewed - afib  CXR personally reviewed - clear    < from: CT Abdomen and Pelvis w/ IV Cont (22 @ 00:22) >    FINDINGS:    CHEST:  LUNGS AND LARGE AIRWAYS: Patent central airways. Mild bibasilar dependent   atelectasis. Well defined low attenuation nodule measuring up to 2 cm at   right posterior cardiophrenic angle (62:2), likely bronchogenic cyst.  PLEURA: No pleural effusion or pneumothorax.  VESSELS: Normal aortic caliber. Atherosclerotic changes.  HEART: The heart is enlarged. No pericardial effusion. Coronary artery   calcifications.  MEDIASTINUM AND NABIL: There is aheterogeneously enhancing anterior   mediastinal mass with calcifications measuring approximately 6.7 x 5.4 x   6.0 cm.  CHEST WALL AND LOWER NECK: Within normal limits.    ABDOMEN AND PELVIS:  LIVER: Steatosis. Sub-cm right hepatic lobe calcification.  BILE DUCTS: Normal caliber.  GALLBLADDER: Within normal limits.  SPLEEN: Within normal limits.  PANCREAS: Within normal limits.  ADRENALS: Within normal limits.  KIDNEYS/URETERS: No hydronephrosis. Bilateral renal cysts and   subcentimeter hypodensities too small to characterize.    BLADDER: Within normal limits.  REPRODUCTIVE ORGANS: Prostate within normal limits.    BOWEL: No bowel obstruction. Appendix is normal.  PERITONEUM: No ascites.  VESSELS: Atherosclerotic changes.  RETROPERITONEUM/LYMPH NODES: No lymphadenopathy.  ABDOMINAL WALL: Large right and small left fat-containing inguinal   hernias.  BONES: Multilevel degenerative changes of the spine. Patchy osteopenia   limits detailed osseous evaluation. Mild indentation of the superior   endplate of T12, likely related to Schmorl's node.    IMPRESSION:    1.  No sequela of acute traumatic injury in the chest, abdomen or pelvis.  2.  Incompletely characterized large heterogeneously enhancing anterior   mediastinal mass. Source unclear. Correlate with prior studies and   consider further workup with PET/CT if indicated.    < end of copied text >

## 2022-03-23 NOTE — H&P ADULT - NSICDXPASTMEDICALHX_GEN_ALL_CORE_FT
PAST MEDICAL HISTORY:  Glaucoma     H/O goiter s/p thyroid resection    HTN (hypertension)     Hypothyroidism

## 2022-03-23 NOTE — ED ADULT NURSE REASSESSMENT NOTE - NS ED NURSE REASSESS COMMENT FT1
patient found sitting at edge of bed and urinated on floor. patient bedding changed and patient positioned for comfort. patient moved into hallway by nursing station for closer supervision

## 2022-03-23 NOTE — H&P ADULT - PROBLEM SELECTOR PLAN 3
-sounds mechanical, possibly provoked due to afib  -monitor on tele, TTE as above  -PT  -orthostatics

## 2022-03-23 NOTE — H&P ADULT - PROBLEM SELECTOR PLAN 2
-?related to prior goiter  -send tsh, FT4, AFP, BHCG, LDH  -dw daughter Anh Lozoya->thinks this may be known, would not want to pursue invasive w/u while inpatient  -outpt follow up w/ Dr. Gannon Head and Neck Surgery

## 2022-03-24 LAB
AFP-TM SERPL-MCNC: 3.4 NG/ML — SIGNIFICANT CHANGE UP
ANION GAP SERPL CALC-SCNC: 13 MMOL/L — SIGNIFICANT CHANGE UP (ref 5–17)
BUN SERPL-MCNC: 14 MG/DL — SIGNIFICANT CHANGE UP (ref 7–23)
CALCIUM SERPL-MCNC: 9.2 MG/DL — SIGNIFICANT CHANGE UP (ref 8.4–10.5)
CHLORIDE SERPL-SCNC: 103 MMOL/L — SIGNIFICANT CHANGE UP (ref 96–108)
CO2 SERPL-SCNC: 24 MMOL/L — SIGNIFICANT CHANGE UP (ref 22–31)
CREAT SERPL-MCNC: 0.85 MG/DL — SIGNIFICANT CHANGE UP (ref 0.5–1.3)
EGFR: 83 ML/MIN/1.73M2 — SIGNIFICANT CHANGE UP
GLUCOSE SERPL-MCNC: 115 MG/DL — HIGH (ref 70–99)
HCG-TM SERPL-ACNC: <1 MIU/ML — SIGNIFICANT CHANGE UP
HCT VFR BLD CALC: 42.6 % — SIGNIFICANT CHANGE UP (ref 39–50)
HGB BLD-MCNC: 14 G/DL — SIGNIFICANT CHANGE UP (ref 13–17)
LDH SERPL L TO P-CCNC: 163 U/L — SIGNIFICANT CHANGE UP (ref 50–242)
MCHC RBC-ENTMCNC: 28.9 PG — SIGNIFICANT CHANGE UP (ref 27–34)
MCHC RBC-ENTMCNC: 32.9 GM/DL — SIGNIFICANT CHANGE UP (ref 32–36)
MCV RBC AUTO: 87.8 FL — SIGNIFICANT CHANGE UP (ref 80–100)
NRBC # BLD: 0 /100 WBCS — SIGNIFICANT CHANGE UP (ref 0–0)
PLATELET # BLD AUTO: 174 K/UL — SIGNIFICANT CHANGE UP (ref 150–400)
POTASSIUM SERPL-MCNC: 3.9 MMOL/L — SIGNIFICANT CHANGE UP (ref 3.5–5.3)
POTASSIUM SERPL-SCNC: 3.9 MMOL/L — SIGNIFICANT CHANGE UP (ref 3.5–5.3)
RBC # BLD: 4.85 M/UL — SIGNIFICANT CHANGE UP (ref 4.2–5.8)
RBC # FLD: 15.3 % — HIGH (ref 10.3–14.5)
SODIUM SERPL-SCNC: 140 MMOL/L — SIGNIFICANT CHANGE UP (ref 135–145)
T4 FREE SERPL-MCNC: 1.2 NG/DL — SIGNIFICANT CHANGE UP (ref 0.9–1.8)
TSH SERPL-MCNC: 2.76 UIU/ML — SIGNIFICANT CHANGE UP (ref 0.27–4.2)
WBC # BLD: 7.34 K/UL — SIGNIFICANT CHANGE UP (ref 3.8–10.5)
WBC # FLD AUTO: 7.34 K/UL — SIGNIFICANT CHANGE UP (ref 3.8–10.5)

## 2022-03-24 PROCEDURE — 99233 SBSQ HOSP IP/OBS HIGH 50: CPT

## 2022-03-24 RX ADMIN — Medication 25 MILLIGRAM(S): at 06:00

## 2022-03-24 RX ADMIN — ENOXAPARIN SODIUM 40 MILLIGRAM(S): 100 INJECTION SUBCUTANEOUS at 15:57

## 2022-03-24 RX ADMIN — LATANOPROST 1 DROP(S): 0.05 SOLUTION/ DROPS OPHTHALMIC; TOPICAL at 21:59

## 2022-03-24 RX ADMIN — Medication 100 MICROGRAM(S): at 06:00

## 2022-03-24 RX ADMIN — FINASTERIDE 5 MILLIGRAM(S): 5 TABLET, FILM COATED ORAL at 12:06

## 2022-03-24 RX ADMIN — ATORVASTATIN CALCIUM 40 MILLIGRAM(S): 80 TABLET, FILM COATED ORAL at 21:54

## 2022-03-24 RX ADMIN — Medication 25 MILLIGRAM(S): at 17:39

## 2022-03-24 RX ADMIN — TAMSULOSIN HYDROCHLORIDE 0.4 MILLIGRAM(S): 0.4 CAPSULE ORAL at 21:54

## 2022-03-24 NOTE — PHYSICAL THERAPY INITIAL EVALUATION ADULT - PRECAUTIONS/LIMITATIONS, REHAB EVAL
He also co hip pain. States he was down ~1hr before he activated his life alert and was able to summon help. He was brought via EMS to hospital for further eval. 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 Additionally ECG showing new onset afib. He also co hip pain. States he was down ~1hr before he activated his life alert and was able to summon help. He was brought via EMS to hospital for further eval. 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 Additionally ECG showing new onset afib./fall precautions

## 2022-03-24 NOTE — PHYSICAL THERAPY INITIAL EVALUATION ADULT - PATIENT/FAMILY/SIGNIFICANT OTHER GOALS STATEMENT, PT EVAL
2300 Discontinued wrist restraints at this time. Will continue to monitor  the need to replace restraints. 0645 Uneventful night. 0700 Bedside and Verbal shift change report given to [id number] (oncoming nurse) by Cat Vincent (offgoing nurse). Report included the following information SBAR, Kardex, OR Summary, Intake/Output, MAR, Recent Results and Cardiac Rhythm NSR. to walk out of this place

## 2022-03-24 NOTE — PHYSICAL THERAPY INITIAL EVALUATION ADULT - GAIT DEVIATIONS NOTED, PT EVAL
decreased demarco/increased time in double stance/decreased velocity of limb motion/decreased step length/decreased stride length

## 2022-03-24 NOTE — PHYSICAL THERAPY INITIAL EVALUATION ADULT - PERTINENT HX OF CURRENT PROBLEM, REHAB EVAL
Pt is a 89M w/ PMH of HTN, ?CAD, HLD, goiter s/p resection now on thyroid replacement presents from the [facility] after a fall. Patient reports yesterday evening he was getting up from the bathroom, slipped and fell. +headstrike but denies LOC.

## 2022-03-25 ENCOUNTER — TRANSCRIPTION ENCOUNTER (OUTPATIENT)
Age: 87
End: 2022-03-25

## 2022-03-25 LAB
ANION GAP SERPL CALC-SCNC: 15 MMOL/L — SIGNIFICANT CHANGE UP (ref 5–17)
BUN SERPL-MCNC: 17 MG/DL — SIGNIFICANT CHANGE UP (ref 7–23)
CALCIUM SERPL-MCNC: 9 MG/DL — SIGNIFICANT CHANGE UP (ref 8.4–10.5)
CHLORIDE SERPL-SCNC: 103 MMOL/L — SIGNIFICANT CHANGE UP (ref 96–108)
CO2 SERPL-SCNC: 24 MMOL/L — SIGNIFICANT CHANGE UP (ref 22–31)
CREAT SERPL-MCNC: 0.82 MG/DL — SIGNIFICANT CHANGE UP (ref 0.5–1.3)
EGFR: 83 ML/MIN/1.73M2 — SIGNIFICANT CHANGE UP
GLUCOSE SERPL-MCNC: 110 MG/DL — HIGH (ref 70–99)
HCT VFR BLD CALC: 42.3 % — SIGNIFICANT CHANGE UP (ref 39–50)
HGB BLD-MCNC: 13.9 G/DL — SIGNIFICANT CHANGE UP (ref 13–17)
MCHC RBC-ENTMCNC: 28.5 PG — SIGNIFICANT CHANGE UP (ref 27–34)
MCHC RBC-ENTMCNC: 32.9 GM/DL — SIGNIFICANT CHANGE UP (ref 32–36)
MCV RBC AUTO: 86.7 FL — SIGNIFICANT CHANGE UP (ref 80–100)
NRBC # BLD: 0 /100 WBCS — SIGNIFICANT CHANGE UP (ref 0–0)
PLATELET # BLD AUTO: 169 K/UL — SIGNIFICANT CHANGE UP (ref 150–400)
POTASSIUM SERPL-MCNC: 3.9 MMOL/L — SIGNIFICANT CHANGE UP (ref 3.5–5.3)
POTASSIUM SERPL-SCNC: 3.9 MMOL/L — SIGNIFICANT CHANGE UP (ref 3.5–5.3)
RBC # BLD: 4.88 M/UL — SIGNIFICANT CHANGE UP (ref 4.2–5.8)
RBC # FLD: 15.2 % — HIGH (ref 10.3–14.5)
SARS-COV-2 RNA SPEC QL NAA+PROBE: SIGNIFICANT CHANGE UP
SODIUM SERPL-SCNC: 142 MMOL/L — SIGNIFICANT CHANGE UP (ref 135–145)
WBC # BLD: 6.97 K/UL — SIGNIFICANT CHANGE UP (ref 3.8–10.5)
WBC # FLD AUTO: 6.97 K/UL — SIGNIFICANT CHANGE UP (ref 3.8–10.5)

## 2022-03-25 PROCEDURE — 99232 SBSQ HOSP IP/OBS MODERATE 35: CPT

## 2022-03-25 RX ORDER — SODIUM CHLORIDE 0.65 %
1 AEROSOL, SPRAY (ML) NASAL
Refills: 0 | Status: DISCONTINUED | OUTPATIENT
Start: 2022-03-25 | End: 2022-03-27

## 2022-03-25 RX ADMIN — FINASTERIDE 5 MILLIGRAM(S): 5 TABLET, FILM COATED ORAL at 13:49

## 2022-03-25 RX ADMIN — Medication 25 MILLIGRAM(S): at 06:30

## 2022-03-25 RX ADMIN — Medication 100 MICROGRAM(S): at 06:30

## 2022-03-25 RX ADMIN — ENOXAPARIN SODIUM 40 MILLIGRAM(S): 100 INJECTION SUBCUTANEOUS at 13:49

## 2022-03-25 RX ADMIN — Medication 25 MILLIGRAM(S): at 18:57

## 2022-03-25 RX ADMIN — TAMSULOSIN HYDROCHLORIDE 0.4 MILLIGRAM(S): 0.4 CAPSULE ORAL at 21:31

## 2022-03-25 RX ADMIN — ATORVASTATIN CALCIUM 40 MILLIGRAM(S): 80 TABLET, FILM COATED ORAL at 21:31

## 2022-03-25 RX ADMIN — LATANOPROST 1 DROP(S): 0.05 SOLUTION/ DROPS OPHTHALMIC; TOPICAL at 21:31

## 2022-03-25 NOTE — DISCHARGE NOTE PROVIDER - NSDCCPCAREPLAN_GEN_ALL_CORE_FT
PRINCIPAL DISCHARGE DIAGNOSIS  Diagnosis: Fall  Assessment and Plan of Treatment: Had a mechanical fall, tripped on his legs  CT of head  and C- spine, xrays were nagative for acute pathology   PT recommended rehab for physical therapy   Fall precaution   please follow up with your primary care physician after rehab        SECONDARY DISCHARGE DIAGNOSES  Diagnosis: Anterior mediastinal tumor  Assessment and Plan of Treatment: Likely related to prior goiter  Discussed with your daughter who did not want to pursue invasive workup while inpatient   Please follow up with Dr. Gannon Head and Neck Surgery for further workup       Diagnosis: New onset atrial fibrillation  Assessment and Plan of Treatment: Found to have A. fib on tele   rate is controlled   Echo shows EF 55%, severe AS 0.5sq cm, TV gradient 55  Anticoagulation was discussed with your daughter and your cardiologist Dr. Panchito Concepcion ([facility]) [phone]    Decided not to start anticoagulation because you have fall risks and Hx of bleeding on aspirin   Continue Metoprolol as directed for rate control   Please follow up with your cardiologist    Diagnosis: Hypothyroidism  Assessment and Plan of Treatment: Continue Synthroid as directed

## 2022-03-25 NOTE — DISCHARGE NOTE PROVIDER - CARE PROVIDER_API CALL
DAVIS JEREZ  Internal Medicine  [address]  [place] [address]  Phone: [phone]  Fax: [phone]  Follow Up Time: 1 month    Dr. Gannon,   Head and Neck surgery  Phone: (   )    -  Fax: (   )    -  Follow Up Time:

## 2022-03-25 NOTE — DISCHARGE NOTE PROVIDER - HOSPITAL COURSE
89M w/ PMHx of HTN, ?CAD, HLD, goiter s/p resection now on thyroid replacement presents from the [facility] after a fall. Patient reports yesterday evening he was getting up from the bathroom, slipped and fell. Sounds mechanical, but may have been provoked in setting of new onset afib.  Had mechanical fall, tripped on his legs. New onset A. fib on tele  HR 70s-90s, Echo showed EF 55%, severe AS 0.5sq cm, TV gradient 55, also found to have anterior mediastinal tumor   Possibly related to prior goitor . A. fib and severe AS discussed with daughter and pt's cardiologist Dr. Panchito Concepcion. Decided not to start AC for fall risks and h/o bleeding on ASA  Daughter did not want any invasive workup while in patient . pt to follow up with his cardiologist and Head and Neck surgery Dr. Gannon  Discharge to [facility] This is a 89M w/ PMHx of HTN, ?CAD, HLD, goiter s/p resection now on thyroid replacement presents from the [facility] after a fall. Patient reports yesterday evening he was getting up from the bathroom, slipped and fell. Sounds mechanical, but may have been provoked in setting of new onset afib. He was admitted in Tele monitoring and plan of care was outlined as below-    1. Fall-   - Had a mechanical fall, tripped on his legs  - Tele- a.fib, HR 70-90s, not orthostatic, Echo shows EF 55%, severe AS 0.5sq cm, TV gradient 55    No AC per daughter for fall risks and h/o bleeding on ASA    his cardiologist Dr. Panchito Concepcion ([facility]) [phone] agrees  - PT evaluated and recommended [facility]  ** spoke to daughter the status and plan.    2. New onset atrial fibrillation-   - Rate controlled a.fib. Echo shows EF 55%, severe AS 0.5sq cm, TV gradient 55  - c/w metoprolol 25mg bid  - Spoke to his cardiologist Dr. Panchito Concepcion ([facility]) [phone]      He agreed with not to include AC  - per daughter he has fall risks and h/o bleeding on ASA.    3. Anterior mediastinal tumor-   - Likely related to prior goiter  - daughter thinks this may be known, would not want to pursue invasive w/u while inpatient  -outpt follow up w/ Dr. Gannon, a Head and Neck Surgeon.    - The patient remained hemodynamically stable and was discharged to Rehab.

## 2022-03-25 NOTE — DISCHARGE NOTE PROVIDER - NSDCMRMEDTOKEN_GEN_ALL_CORE_FT
atorvastatin 40 mg oral tablet: 1 tab(s) orally once a day  finasteride 5 mg oral tablet: 1 tab(s) orally once a day  Flomax 0.4 mg oral capsule: 1 cap(s) orally once a day  Lumigan 0.01% ophthalmic solution: 1 drop(s) to each affected eye once a day (in the evening)  metoprolol tartrate 25 mg oral tablet: 1 tab(s) orally 2 times a day  Myrbetriq 50 mg oral tablet, extended release: 1 tab(s) orally once a day  Synthroid 100 mcg (0.1 mg) oral tablet: 1 tab(s) orally once a day   acetaminophen 325 mg oral tablet: 2 tab(s) orally every 6 hours, As needed, Temp greater or equal to 38C (100.4F), Mild Pain (1 - 3)  atorvastatin 40 mg oral tablet: 1 tab(s) orally once a day (at bedtime)  finasteride 5 mg oral tablet: 1 tab(s) orally once a day  latanoprost 0.005% ophthalmic solution: 1 drop(s) to each affected eye once a day (at bedtime)  levothyroxine 100 mcg (0.1 mg) oral tablet: 1 tab(s) orally once a day  Lumigan 0.01% ophthalmic solution: 1 drop(s) to each affected eye once a day (in the evening)  metoprolol tartrate 25 mg oral tablet: 1 tab(s) orally 2 times a day  Myrbetriq 50 mg oral tablet, extended release: 1 tab(s) orally once a day  sodium chloride 0.65% nasal spray: 1 spray(s) nasal 3 times a day  tamsulosin 0.4 mg oral capsule: 1 cap(s) orally once a day (at bedtime)

## 2022-03-25 NOTE — PROGRESS NOTE ADULT - PROBLEM SELECTOR PLAN 1
Had a mechanical fall, tripped on his legs  - Tele- a.fib, HR 70-90s, not orthostatic, Echo shows EF 55%, severe AS 0.5sq cm, TV gradient 55    No AC per daughter for fall risks and h/o bleeding on ASA    his cardiologist Dr. Panchito Concepcion ([facility]) [phone] agrees  - PT evaluated and recommended YUN  - d/c to Rehab once ready  ** spoke to daughter the status and plan Had a mechanical fall, tripped on his legs  - Tele- a.fib, HR 70-90s, not orthostatic, Echo shows EF 55%, severe AS 0.5sq cm, TV gradient 55    No AC per daughter for fall risks and h/o bleeding on ASA    his cardiologist Dr. Panchito Concepcion ([facility]) [phone] agrees  - PT evaluated and recommended YUN  - d/c to Rehab once ready, spoke to CM and NP  ** spoke to daughter the status and plan

## 2022-03-25 NOTE — DISCHARGE NOTE PROVIDER - PROVIDER TOKENS
PROVIDER:[TOKEN:[[id number]],FOLLOWUP:[1 month]],FREE:[LAST:[Dr. Gannon],PHONE:[(   )    -],FAX:[(   )    -],ADDRESS:[[facility] Ne[facility] surgery]]

## 2022-03-26 PROCEDURE — 99232 SBSQ HOSP IP/OBS MODERATE 35: CPT

## 2022-03-26 RX ADMIN — Medication 100 MICROGRAM(S): at 06:04

## 2022-03-26 RX ADMIN — Medication 25 MILLIGRAM(S): at 18:10

## 2022-03-26 RX ADMIN — Medication 25 MILLIGRAM(S): at 06:04

## 2022-03-26 RX ADMIN — ENOXAPARIN SODIUM 40 MILLIGRAM(S): 100 INJECTION SUBCUTANEOUS at 15:34

## 2022-03-26 RX ADMIN — FINASTERIDE 5 MILLIGRAM(S): 5 TABLET, FILM COATED ORAL at 11:44

## 2022-03-26 NOTE — PROGRESS NOTE ADULT - PROBLEM SELECTOR PLAN 1
Had a mechanical fall, tripped on his legs  - Tele- a.fib, HR 70-90s, not orthostatic, Echo shows EF 55%, severe AS 0.5sq cm, TV gradient 55    No AC per daughter for fall risks and h/o bleeding on ASA    his cardiologist Dr. Panchito Concepcion ([facility]) [phone] agrees  - PT evaluated and recommended YUN  - d/c to Rehab once ready, spoke to CM and NP

## 2022-03-27 ENCOUNTER — TRANSCRIPTION ENCOUNTER (OUTPATIENT)
Age: 87
End: 2022-03-27

## 2022-03-27 VITALS — WEIGHT: 182.76 LBS

## 2022-03-27 PROCEDURE — 97161 PT EVAL LOW COMPLEX 20 MIN: CPT

## 2022-03-27 PROCEDURE — 36415 COLL VENOUS BLD VENIPUNCTURE: CPT

## 2022-03-27 PROCEDURE — 83615 LACTATE (LD) (LDH) ENZYME: CPT

## 2022-03-27 PROCEDURE — 73020 X-RAY EXAM OF SHOULDER: CPT

## 2022-03-27 PROCEDURE — 83605 ASSAY OF LACTIC ACID: CPT

## 2022-03-27 PROCEDURE — 84439 ASSAY OF FREE THYROXINE: CPT

## 2022-03-27 PROCEDURE — 82435 ASSAY OF BLOOD CHLORIDE: CPT

## 2022-03-27 PROCEDURE — 73551 X-RAY EXAM OF FEMUR 1: CPT

## 2022-03-27 PROCEDURE — 72125 CT NECK SPINE W/O DYE: CPT | Mod: MA

## 2022-03-27 PROCEDURE — 82962 GLUCOSE BLOOD TEST: CPT

## 2022-03-27 PROCEDURE — 74177 CT ABD & PELVIS W/CONTRAST: CPT | Mod: MA

## 2022-03-27 PROCEDURE — 70450 CT HEAD/BRAIN W/O DYE: CPT | Mod: MA

## 2022-03-27 PROCEDURE — 85018 HEMOGLOBIN: CPT

## 2022-03-27 PROCEDURE — 85610 PROTHROMBIN TIME: CPT

## 2022-03-27 PROCEDURE — 84484 ASSAY OF TROPONIN QUANT: CPT

## 2022-03-27 PROCEDURE — 84132 ASSAY OF SERUM POTASSIUM: CPT

## 2022-03-27 PROCEDURE — 84704 HCG FREE BETACHAIN TEST: CPT

## 2022-03-27 PROCEDURE — 73060 X-RAY EXAM OF HUMERUS: CPT

## 2022-03-27 PROCEDURE — 82803 BLOOD GASES ANY COMBINATION: CPT

## 2022-03-27 PROCEDURE — 90471 IMMUNIZATION ADMIN: CPT

## 2022-03-27 PROCEDURE — 85025 COMPLETE CBC W/AUTO DIFF WBC: CPT

## 2022-03-27 PROCEDURE — 71260 CT THORAX DX C+: CPT | Mod: MA

## 2022-03-27 PROCEDURE — U0005: CPT

## 2022-03-27 PROCEDURE — 93005 ELECTROCARDIOGRAM TRACING: CPT

## 2022-03-27 PROCEDURE — 73030 X-RAY EXAM OF SHOULDER: CPT

## 2022-03-27 PROCEDURE — 84443 ASSAY THYROID STIM HORMONE: CPT

## 2022-03-27 PROCEDURE — 73502 X-RAY EXAM HIP UNI 2-3 VIEWS: CPT

## 2022-03-27 PROCEDURE — 71045 X-RAY EXAM CHEST 1 VIEW: CPT

## 2022-03-27 PROCEDURE — 82330 ASSAY OF CALCIUM: CPT

## 2022-03-27 PROCEDURE — 80048 BASIC METABOLIC PNL TOTAL CA: CPT

## 2022-03-27 PROCEDURE — 82105 ALPHA-FETOPROTEIN SERUM: CPT

## 2022-03-27 PROCEDURE — 73080 X-RAY EXAM OF ELBOW: CPT

## 2022-03-27 PROCEDURE — 85027 COMPLETE CBC AUTOMATED: CPT

## 2022-03-27 PROCEDURE — 82947 ASSAY GLUCOSE BLOOD QUANT: CPT

## 2022-03-27 PROCEDURE — 82565 ASSAY OF CREATININE: CPT

## 2022-03-27 PROCEDURE — 99285 EMERGENCY DEPT VISIT HI MDM: CPT

## 2022-03-27 PROCEDURE — 80053 COMPREHEN METABOLIC PANEL: CPT

## 2022-03-27 PROCEDURE — U0003: CPT

## 2022-03-27 PROCEDURE — 85014 HEMATOCRIT: CPT

## 2022-03-27 PROCEDURE — 73090 X-RAY EXAM OF FOREARM: CPT

## 2022-03-27 PROCEDURE — 81003 URINALYSIS AUTO W/O SCOPE: CPT

## 2022-03-27 PROCEDURE — 93306 TTE W/DOPPLER COMPLETE: CPT

## 2022-03-27 PROCEDURE — 85730 THROMBOPLASTIN TIME PARTIAL: CPT

## 2022-03-27 PROCEDURE — 84295 ASSAY OF SERUM SODIUM: CPT

## 2022-03-27 PROCEDURE — 83690 ASSAY OF LIPASE: CPT

## 2022-03-27 PROCEDURE — 90715 TDAP VACCINE 7 YRS/> IM: CPT

## 2022-03-27 RX ORDER — FINASTERIDE 5 MG/1
1 TABLET, FILM COATED ORAL
Qty: 0 | Refills: 0 | DISCHARGE
Start: 2022-03-27

## 2022-03-27 RX ORDER — ATORVASTATIN CALCIUM 80 MG/1
1 TABLET, FILM COATED ORAL
Qty: 0 | Refills: 0 | DISCHARGE

## 2022-03-27 RX ORDER — METOPROLOL TARTRATE 50 MG
1 TABLET ORAL
Qty: 0 | Refills: 0 | DISCHARGE
Start: 2022-03-27

## 2022-03-27 RX ORDER — TAMSULOSIN HYDROCHLORIDE 0.4 MG/1
1 CAPSULE ORAL
Qty: 0 | Refills: 0 | DISCHARGE

## 2022-03-27 RX ORDER — ATORVASTATIN CALCIUM 80 MG/1
1 TABLET, FILM COATED ORAL
Qty: 0 | Refills: 0 | DISCHARGE
Start: 2022-03-27

## 2022-03-27 RX ORDER — TAMSULOSIN HYDROCHLORIDE 0.4 MG/1
1 CAPSULE ORAL
Qty: 0 | Refills: 0 | DISCHARGE
Start: 2022-03-27

## 2022-03-27 RX ORDER — LATANOPROST 0.05 MG/ML
1 SOLUTION/ DROPS OPHTHALMIC; TOPICAL
Qty: 0 | Refills: 0 | DISCHARGE
Start: 2022-03-27

## 2022-03-27 RX ORDER — ACETAMINOPHEN 500 MG
2 TABLET ORAL
Qty: 0 | Refills: 0 | DISCHARGE
Start: 2022-03-27

## 2022-03-27 RX ORDER — LEVOTHYROXINE SODIUM 125 MCG
1 TABLET ORAL
Qty: 0 | Refills: 0 | DISCHARGE

## 2022-03-27 RX ORDER — METOPROLOL TARTRATE 50 MG
1 TABLET ORAL
Qty: 0 | Refills: 0 | DISCHARGE

## 2022-03-27 RX ORDER — LEVOTHYROXINE SODIUM 125 MCG
1 TABLET ORAL
Qty: 0 | Refills: 0 | DISCHARGE
Start: 2022-03-27

## 2022-03-27 RX ORDER — FINASTERIDE 5 MG/1
1 TABLET, FILM COATED ORAL
Qty: 0 | Refills: 0 | DISCHARGE

## 2022-03-27 RX ORDER — SODIUM CHLORIDE 0.65 %
1 AEROSOL, SPRAY (ML) NASAL
Qty: 0 | Refills: 0 | DISCHARGE
Start: 2022-03-27

## 2022-03-27 RX ADMIN — ATORVASTATIN CALCIUM 40 MILLIGRAM(S): 80 TABLET, FILM COATED ORAL at 02:12

## 2022-03-27 RX ADMIN — LATANOPROST 1 DROP(S): 0.05 SOLUTION/ DROPS OPHTHALMIC; TOPICAL at 02:12

## 2022-03-27 RX ADMIN — TAMSULOSIN HYDROCHLORIDE 0.4 MILLIGRAM(S): 0.4 CAPSULE ORAL at 02:12

## 2022-03-27 RX ADMIN — Medication 25 MILLIGRAM(S): at 05:47

## 2022-03-27 RX ADMIN — FINASTERIDE 5 MILLIGRAM(S): 5 TABLET, FILM COATED ORAL at 11:39

## 2022-03-27 RX ADMIN — Medication 100 MICROGRAM(S): at 05:47

## 2022-03-27 RX ADMIN — ENOXAPARIN SODIUM 40 MILLIGRAM(S): 100 INJECTION SUBCUTANEOUS at 13:00

## 2022-03-27 NOTE — DISCHARGE NOTE NURSING/CASE MANAGEMENT/SOCIAL WORK - PATIENT PORTAL LINK FT
You can access the FollowMyHealth Patient Portal offered by [facility] by registering at the following website: [email or web address]. By joining [facility]’s FollowMyHealth portal, you will also be able to view your health information using other applications (apps) compatible with our system.

## 2022-03-27 NOTE — DISCHARGE NOTE NURSING/CASE MANAGEMENT/SOCIAL WORK - NSDCPEFALRISK_GEN_ALL_CORE
For information on Fall & Injury Prevention, visit: [email or web address] OR  [email or web address] OR  https://www.cdc.gov/steadi/patient.html

## 2022-03-27 NOTE — PROGRESS NOTE ADULT - PROBLEM SELECTOR PROBLEM 3
Anterior mediastinal tumor

## 2022-03-27 NOTE — PROGRESS NOTE ADULT - PROBLEM SELECTOR PLAN 1
Had a mechanical fall, tripped on his legs  - Tele- a.fib, HR 70-90s, not orthostatic, Echo shows EF 55%, severe AS 0.5sq cm, TV gradient 55    No AC per daughter for fall risks and h/o bleeding on ASA    his cardiologist Dr. Panchito Concepcion ([place]) [phone] agrees  - PT evaluated and recommended YUN  - DC to rehab today.

## 2022-03-27 NOTE — PROGRESS NOTE ADULT - SUBJECTIVE AND OBJECTIVE BOX
Mohsin Khan, MD  Attending Physician, Division Of Hospital Medicine  Pager: [phone], Office: [phone]  Off hour pager: [phone]    Patient is a 89y old  Male who presents with a chief complaint of fall , new a.fib    SUBJECTIVE / OVERNIGHT EVENTS:  Seen, examined the patient this am  Resting in bed, feels good, no SOB, chest pain, Calm, Tele- A. fib controlled primo, VSS    MEDICATIONS  (STANDING):  atorvastatin 40 milliGRAM(s) Oral at bedtime  enoxaparin Injectable 40 milliGRAM(s) SubCutaneous every 24 hours  finasteride 5 milliGRAM(s) Oral daily  latanoprost 0.005% Ophthalmic Solution 1 Drop(s) Both EYES at bedtime  levothyroxine 100 MICROGram(s) Oral daily  metoprolol tartrate 25 milliGRAM(s) Oral two times a day  tamsulosin 0.4 milliGRAM(s) Oral at bedtime    MEDICATIONS  (PRN):  acetaminophen     Tablet .. 650 milliGRAM(s) Oral every 6 hours PRN Temp greater or equal to 38C (100.4F), Mild Pain (1 - 3)  sodium chloride 0.65% Nasal 1 Spray(s) Both Nostrils two times a day PRN Nasal Congestion      Vital Signs Last 24 Hrs  T(C): 36.9 (25 Mar 2022 13:15), Max: 37 (25 Mar 2022 05:56)  T(F): 98.5 (25 Mar 2022 13:15), Max: 98.6 (25 Mar 2022 05:56)  HR: 86 (25 Mar 2022 13:15) (77 - 86)  BP: 146/86 (25 Mar 2022 13:15) (117/75 - 146/86)  BP(mean): --  RR: 18 (25 Mar 2022 13:15) (18 - 18)  SpO2: 97% (25 Mar 2022 13:15) (95% - 97%)  CAPILLARY BLOOD GLUCOSE        I&O's Summary    24 Mar 2022 07:01  -  25 Mar 2022 07:00  --------------------------------------------------------  IN: 750 mL / OUT: 650 mL / NET: 100 mL    25 Mar 2022 07:01  -  25 Mar 2022 14:50  --------------------------------------------------------  IN: 120 mL / OUT: 100 mL / NET: 20 mL        PHYSICAL EXAM:-  GENERAL: NAD, well-developed  EYES: EOMI, PERRLA, conjunctiva and sclera clear  NECK: Supple, No JVD, no thyromegaly  CHEST/LUNG: Clear to auscultation bilaterally; No wheeze  HEART: irregular rate and rhythm; S1, S2 audible, No murmurs, rubs, or gallops  ABDOMEN: Soft, Nontender, Nondistended; Bowel sounds present  EXTREMITIES:  2+ Peripheral Pulses, No clubbing, cyanosis, or edema  NEURO: AAOx3, no focal deficit      LABS:                        13.9   6.97  )-----------( 169      ( 25 Mar 2022 06:27 )             42.3     03-25    142  |  103  |  17  ----------------------------<  110<H>  3.9   |  24  |  0.82    Ca    9.0      25 Mar 2022 06:27      RADIOLOGY & ADDITIONAL TESTS:    Imaging Personally Reviewed: CXR, Echo    
Mohsin Khan, MD  Attending Physician, Division Of Hospital Medicine  Pager: [phone], Office: [phone]  Off hour pager: [phone]    Patient is a 89y old  Male who presents with a chief complaint of fall in [facility]    SUBJECTIVE / OVERNIGHT EVENTS:  Seen, examined the patient this am  Resting in bed, no c/o pain, SOB, fever. feels ok overall, VSS    MEDICATIONS  (STANDING):  atorvastatin 40 milliGRAM(s) Oral at bedtime  enoxaparin Injectable 40 milliGRAM(s) SubCutaneous every 24 hours  finasteride 5 milliGRAM(s) Oral daily  latanoprost 0.005% Ophthalmic Solution 1 Drop(s) Both EYES at bedtime  levothyroxine 100 MICROGram(s) Oral daily  metoprolol tartrate 25 milliGRAM(s) Oral two times a day  tamsulosin 0.4 milliGRAM(s) Oral at bedtime    MEDICATIONS  (PRN):  acetaminophen     Tablet .. 650 milliGRAM(s) Oral every 6 hours PRN Temp greater or equal to 38C (100.4F), Mild Pain (1 - 3)      Vital Signs Last 24 Hrs  T(C): 37.1 (24 Mar 2022 11:13), Max: 37.1 (24 Mar 2022 11:13)  T(F): 98.7 (24 Mar 2022 11:13), Max: 98.7 (24 Mar 2022 11:13)  HR: 74 (24 Mar 2022 11:13) (74 - 97)  BP: 138/76 (24 Mar 2022 11:13) (138/76 - 164/91)  BP(mean): --  RR: 18 (24 Mar 2022 11:13) (18 - 19)  SpO2: 95% (24 Mar 2022 11:13) (95% - 97%)  CAPILLARY BLOOD GLUCOSE        I&O's Summary    23 Mar 2022 07:01  -  24 Mar 2022 07:00  --------------------------------------------------------  IN: 510 mL / OUT: 1125 mL / NET: -615 mL    24 Mar 2022 07:01  -  24 Mar 2022 15:29  --------------------------------------------------------  IN: 360 mL / OUT: 250 mL / NET: 110 mL        PHYSICAL EXAM:-  GENERAL: NAD, well-developed  EYES: EOMI, PERRLA, conjunctiva and sclera clear  NECK: Supple, No JVD, no thyromegaly  CHEST/LUNG: Clear to auscultation bilaterally; No wheeze  HEART: Regular rate and rhythm; S1, S2 audible, No murmurs, rubs, or gallops  ABDOMEN: Soft, Nontender, Nondistended; Bowel sounds present  EXTREMITIES:  2+ Peripheral Pulses, No clubbing, cyanosis, or edema  NEURO: AAOx3, no focal deficit      LABS:                        14.0   7.34  )-----------( 174      ( 24 Mar 2022 06:30 )             42.6     03-24    140  |  103  |  14  ----------------------------<  115<H>  3.9   |  24  |  0.85    Ca    9.2      24 Mar 2022 06:30    TPro  6.4  /  Alb  4.3  /  TBili  0.4  /  DBili  x   /  AST  17  /  ALT  13  /  AlkPhos  69  03-22    PT/INR - ( 22 Mar 2022 22:15 )   PT: 12.0 sec;   INR: 1.04 ratio         PTT - ( 22 Mar 2022 22:15 )  PTT:26.5 sec      Urinalysis Basic - ( 23 Mar 2022 04:28 )    Color: Light Yellow / Appearance: Clear / S.048 / pH: x  Gluc: x / Ketone: Negative  / Bili: Negative / Urobili: Negative   Blood: x / Protein: Trace / Nitrite: Negative   Leuk Esterase: Negative / RBC: x / WBC x   Sq Epi: x / Non Sq Epi: x / Bacteria: x        RADIOLOGY & ADDITIONAL TESTS:    Imaging Personally Reviewed: CT, CXR, Hip x-ray    
Patient is a 89y old  Male who presents with a chief complaint of fall (25 Mar 2022 21:23)      SUBJECTIVE / OVERNIGHT EVENTS:  Feels well.  No complaints.  Off of all oxygen. Saturating well.     MEDICATIONS  (STANDING):  atorvastatin 40 milliGRAM(s) Oral at bedtime  enoxaparin Injectable 40 milliGRAM(s) SubCutaneous every 24 hours  finasteride 5 milliGRAM(s) Oral daily  latanoprost 0.005% Ophthalmic Solution 1 Drop(s) Both EYES at bedtime  levothyroxine 100 MICROGram(s) Oral daily  metoprolol tartrate 25 milliGRAM(s) Oral two times a day  tamsulosin 0.4 milliGRAM(s) Oral at bedtime    MEDICATIONS  (PRN):  acetaminophen     Tablet .. 650 milliGRAM(s) Oral every 6 hours PRN Temp greater or equal to 38C (100.4F), Mild Pain (1 - 3)  sodium chloride 0.65% Nasal 1 Spray(s) Both Nostrils two times a day PRN Nasal Congestion      CAPILLARY BLOOD GLUCOSE        I&O's Summary    25 Mar 2022 07:01  -  26 Mar 2022 07:00  --------------------------------------------------------  IN: 970 mL / OUT: 540 mL / NET: 430 mL    26 Mar 2022 07:01  -  26 Mar 2022 09:20  --------------------------------------------------------  IN: 360 mL / OUT: 0 mL / NET: 360 mL        PHYSICAL EXAM:  Vital Signs Last 24 Hrs  T(C): 36.1 (26 Mar 2022 04:30), Max: 36.9 (25 Mar 2022 13:15)  T(F): 97 (26 Mar 2022 04:30), Max: 98.5 (25 Mar 2022 13:15)  HR: 76 (26 Mar 2022 04:30) (68 - 86)  BP: 108/69 (26 Mar 2022 04:30) (108/69 - 146/86)  BP(mean): --  RR: 18 (26 Mar 2022 04:30) (18 - 18)  SpO2: 95% (26 Mar 2022 04:30) (95% - 97%)  GENERAL: NAD, well-developed, elderly  HEAD:  Atraumatic, Normocephalic  EYES: EOMI, PERRLA, conjunctiva and sclera clear  NECK: Supple, No JVD  CHEST/LUNG: Clear to auscultation bilaterally; No wheeze  HEART: Regular rate and rhythm; No murmurs, rubs, or gallops  ABDOMEN: Soft, Nontender, Nondistended; Bowel sounds present  EXTREMITIES:  2+ Peripheral Pulses, No clubbing, cyanosis, or edema  PSYCH: AAOx3  NEUROLOGY: non-focal  SKIN: No rashes or lesions    LABS:                        13.9   6.97  )-----------( 169      ( 25 Mar 2022 06:27 )             42.3     03-25    142  |  103  |  17  ----------------------------<  110<H>  3.9   |  24  |  0.82    Ca    9.0      25 Mar 2022 06:27                RADIOLOGY & ADDITIONAL TESTS:    Imaging Personally Reviewed:    Consultant(s) Notes Reviewed:      Care Discussed with Consultants/Other Providers:  
Patient is a 89y old  Male who presents with a chief complaint of fall (25 Mar 2022 21:23)      SUBJECTIVE / OVERNIGHT EVENTS:  Reports no complaints except a stuffy nose.  No shortness of breath, chest pain or palpitations.  Still on 3L NC. However, will titrate off    MEDICATIONS  (STANDING):  atorvastatin 40 milliGRAM(s) Oral at bedtime  enoxaparin Injectable 40 milliGRAM(s) SubCutaneous every 24 hours  finasteride 5 milliGRAM(s) Oral daily  latanoprost 0.005% Ophthalmic Solution 1 Drop(s) Both EYES at bedtime  levothyroxine 100 MICROGram(s) Oral daily  metoprolol tartrate 25 milliGRAM(s) Oral two times a day  tamsulosin 0.4 milliGRAM(s) Oral at bedtime    MEDICATIONS  (PRN):  acetaminophen     Tablet .. 650 milliGRAM(s) Oral every 6 hours PRN Temp greater or equal to 38C (100.4F), Mild Pain (1 - 3)  sodium chloride 0.65% Nasal 1 Spray(s) Both Nostrils two times a day PRN Nasal Congestion      CAPILLARY BLOOD GLUCOSE        I&O's Summary    25 Mar 2022 07:01  -  26 Mar 2022 07:00  --------------------------------------------------------  IN: 970 mL / OUT: 540 mL / NET: 430 mL    26 Mar 2022 07:01  -  26 Mar 2022 09:20  --------------------------------------------------------  IN: 360 mL / OUT: 0 mL / NET: 360 mL        PHYSICAL EXAM:  Vital Signs Last 24 Hrs  T(C): 36.1 (26 Mar 2022 04:30), Max: 36.9 (25 Mar 2022 13:15)  T(F): 97 (26 Mar 2022 04:30), Max: 98.5 (25 Mar 2022 13:15)  HR: 76 (26 Mar 2022 04:30) (68 - 86)  BP: 108/69 (26 Mar 2022 04:30) (108/69 - 146/86)  BP(mean): --  RR: 18 (26 Mar 2022 04:30) (18 - 18)  SpO2: 95% (26 Mar 2022 04:30) (95% - 97%)  GENERAL: NAD, well-developed, elderly  HEAD:  Atraumatic, Normocephalic  EYES: EOMI, PERRLA, conjunctiva and sclera clear  NECK: Supple, No JVD  CHEST/LUNG: Clear to auscultation bilaterally; No wheeze  HEART: Regular rate and rhythm; No murmurs, rubs, or gallops  ABDOMEN: Soft, Nontender, Nondistended; Bowel sounds present  EXTREMITIES:  2+ Peripheral Pulses, No clubbing, cyanosis, or edema  PSYCH: AAOx3  NEUROLOGY: non-focal  SKIN: No rashes or lesions    LABS:                        13.9   6.97  )-----------( 169      ( 25 Mar 2022 06:27 )             42.3     03-25    142  |  103  |  17  ----------------------------<  110<H>  3.9   |  24  |  0.82    Ca    9.0      25 Mar 2022 06:27                RADIOLOGY & ADDITIONAL TESTS:    Imaging Personally Reviewed:    Consultant(s) Notes Reviewed:      Care Discussed with Consultants/Other Providers:

## 2022-03-27 NOTE — DISCHARGE NOTE NURSING/CASE MANAGEMENT/SOCIAL WORK - NSDCVIVACCINE_GEN_ALL_CORE_FT
Tdap; 22-Mar-2022 22:39; Lurdes Marie (RN); [employer]; [id number] (Exp. Date: 18-Jan-2024); IntraMuscular; Deltoid Left.; 0.5 milliLiter(s); VIS (VIS Published: 09-May-2013, VIS Presented: 22-Mar-2022);

## 2022-03-27 NOTE — PROGRESS NOTE ADULT - PROBLEM SELECTOR PLAN 2
Rate controlled a.fib. Echo shows EF 55%, severe AS 0.5sq cm, TV gradient 55  - c/w metoprolol 25mg bid  - Primary team Spoke to his cardiologist Dr. Panchito Concepcion ([facility]) [phone]      He agrees no AC, No AC   - Primary team d/w daughter he has fall risks and h/o bleeding on ASA  - Off O2.
Rate controlled a.fib. Echo shows EF 55%, severe AS 0.5sq cm, TV gradient 55  - c/w metoprolol 25mg bid  - Spoke to his cardiologist Dr. Panchito Concepcion ([facility]) [phone]      He agrees no AC, No AC   - per daughter he has fall risks and h/o bleeding on ASA
Rate controlled a.fib. Echo shows EF 55%, severe AS 0.5sq cm, TV gradient 55  - c/w metoprolol 25mg bid  - called his cardiologist Panchito Concepcion ([facility]) [phone] service and will make plan accordingly    spoke to daughter about the findings  - discussed AC w/ daughter risks/benefits of bleeding vs CVA.     She prefers to hold off on AC given age and prior issues w/ bleeding/bruising on ASA and fall risks
Rate controlled a.fib. Echo shows EF 55%, severe AS 0.5sq cm, TV gradient 55  - c/w metoprolol 25mg bid  - Spoke to his cardiologist Dr. Panchito Concepcion ([facility]) [phone]      He agrees no AC, No AC   - per daughter he has fall risks and h/o bleeding on ASA  - Titrate off of NC today.

## 2022-03-27 NOTE — PROGRESS NOTE ADULT - PROBLEM SELECTOR PLAN 3
Likely related to prior goiter  -d/w daughter Anh Lozoya->thinks this may be known, would not want to pursue invasive w/u while inpatient  -outpt follow up w/ Dr. [facility]
Likely related to prior goiter  -Primary team d/w daughter Anh Lozoya->thinks this may be known, would not want to pursue invasive w/u while inpatient  -outpt follow up w/ Dr. [facility]
Likely related to prior goiter  -d/w daughter Anh Lozoya->thinks this may be known, would not want to pursue invasive w/u while inpatient  -outpt follow up w/ Dr. [facility]
Likely related to prior goiter  -d/w daughter Anh Lozoya->thinks this may be known, would not want to pursue invasive w/u while inpatient  -outpt follow up w/ Dr. [facility]

## 2022-03-28 PROBLEM — Z86.39 PERSONAL HISTORY OF OTHER ENDOCRINE, NUTRITIONAL AND METABOLIC DISEASE: Chronic | Status: ACTIVE | Noted: 2022-03-23

## 2022-05-04 ENCOUNTER — RESULT REVIEW (OUTPATIENT)
Age: 87
End: 2022-05-04

## 2023-02-27 ENCOUNTER — EMERGENCY (EMERGENCY)
Facility: HOSPITAL | Age: 88
LOS: 1 days | Discharge: HOME | End: 2023-02-27
Attending: EMERGENCY MEDICINE
Payer: MEDICARE

## 2023-02-27 VITALS
DIASTOLIC BLOOD PRESSURE: 66 MMHG | HEART RATE: 72 BPM | TEMPERATURE: 98 F | SYSTOLIC BLOOD PRESSURE: 132 MMHG | OXYGEN SATURATION: 99 % | RESPIRATION RATE: 18 BRPM

## 2023-02-27 VITALS
WEIGHT: 184.97 LBS | RESPIRATION RATE: 18 BRPM | OXYGEN SATURATION: 95 % | SYSTOLIC BLOOD PRESSURE: 152 MMHG | TEMPERATURE: 97 F | HEIGHT: 70 IN | HEART RATE: 79 BPM | DIASTOLIC BLOOD PRESSURE: 81 MMHG

## 2023-02-27 DIAGNOSIS — Z98.890 OTHER SPECIFIED POSTPROCEDURAL STATES: Chronic | ICD-10-CM

## 2023-02-27 LAB
ALBUMIN SERPL ELPH-MCNC: 4.3 G/DL — SIGNIFICANT CHANGE UP (ref 3.3–5)
ALP SERPL-CCNC: 57 U/L — SIGNIFICANT CHANGE UP (ref 40–120)
ALT FLD-CCNC: 18 U/L — SIGNIFICANT CHANGE UP (ref 10–45)
ANION GAP SERPL CALC-SCNC: 14 MMOL/L — SIGNIFICANT CHANGE UP (ref 5–17)
AST SERPL-CCNC: 17 U/L — SIGNIFICANT CHANGE UP (ref 10–40)
BASOPHILS # BLD AUTO: 0.01 K/UL — SIGNIFICANT CHANGE UP (ref 0–0.2)
BASOPHILS NFR BLD AUTO: 0.1 % — SIGNIFICANT CHANGE UP (ref 0–2)
BILIRUB SERPL-MCNC: 0.8 MG/DL — SIGNIFICANT CHANGE UP (ref 0.2–1.2)
BUN SERPL-MCNC: 21 MG/DL — SIGNIFICANT CHANGE UP (ref 7–23)
CALCIUM SERPL-MCNC: 8.8 MG/DL — SIGNIFICANT CHANGE UP (ref 8.4–10.5)
CHLORIDE SERPL-SCNC: 103 MMOL/L — SIGNIFICANT CHANGE UP (ref 96–108)
CO2 SERPL-SCNC: 23 MMOL/L — SIGNIFICANT CHANGE UP (ref 22–31)
CREAT SERPL-MCNC: 0.9 MG/DL — SIGNIFICANT CHANGE UP (ref 0.5–1.3)
EGFR: 81 ML/MIN/1.73M2 — SIGNIFICANT CHANGE UP
EOSINOPHIL # BLD AUTO: 0.29 K/UL — SIGNIFICANT CHANGE UP (ref 0–0.5)
EOSINOPHIL NFR BLD AUTO: 4.3 % — SIGNIFICANT CHANGE UP (ref 0–6)
FLUAV AG NPH QL: SIGNIFICANT CHANGE UP
FLUBV AG NPH QL: SIGNIFICANT CHANGE UP
GLUCOSE SERPL-MCNC: 138 MG/DL — HIGH (ref 70–99)
HCT VFR BLD CALC: 41.8 % — SIGNIFICANT CHANGE UP (ref 39–50)
HGB BLD-MCNC: 13.7 G/DL — SIGNIFICANT CHANGE UP (ref 13–17)
IMM GRANULOCYTES NFR BLD AUTO: 0.6 % — SIGNIFICANT CHANGE UP (ref 0–0.9)
LYMPHOCYTES # BLD AUTO: 0.63 K/UL — LOW (ref 1–3.3)
LYMPHOCYTES # BLD AUTO: 9.2 % — LOW (ref 13–44)
MCHC RBC-ENTMCNC: 29.7 PG — SIGNIFICANT CHANGE UP (ref 27–34)
MCHC RBC-ENTMCNC: 32.8 GM/DL — SIGNIFICANT CHANGE UP (ref 32–36)
MCV RBC AUTO: 90.5 FL — SIGNIFICANT CHANGE UP (ref 80–100)
MONOCYTES # BLD AUTO: 0.46 K/UL — SIGNIFICANT CHANGE UP (ref 0–0.9)
MONOCYTES NFR BLD AUTO: 6.7 % — SIGNIFICANT CHANGE UP (ref 2–14)
NEUTROPHILS # BLD AUTO: 5.39 K/UL — SIGNIFICANT CHANGE UP (ref 1.8–7.4)
NEUTROPHILS NFR BLD AUTO: 79.1 % — HIGH (ref 43–77)
NRBC # BLD: 0 /100 WBCS — SIGNIFICANT CHANGE UP (ref 0–0)
PLATELET # BLD AUTO: 146 K/UL — LOW (ref 150–400)
POTASSIUM SERPL-MCNC: 3.8 MMOL/L — SIGNIFICANT CHANGE UP (ref 3.5–5.3)
POTASSIUM SERPL-SCNC: 3.8 MMOL/L — SIGNIFICANT CHANGE UP (ref 3.5–5.3)
PROT SERPL-MCNC: 6.8 G/DL — SIGNIFICANT CHANGE UP (ref 6–8.3)
RBC # BLD: 4.62 M/UL — SIGNIFICANT CHANGE UP (ref 4.2–5.8)
RBC # FLD: 15.8 % — HIGH (ref 10.3–14.5)
RSV RNA NPH QL NAA+NON-PROBE: SIGNIFICANT CHANGE UP
SARS-COV-2 RNA SPEC QL NAA+PROBE: SIGNIFICANT CHANGE UP
SODIUM SERPL-SCNC: 140 MMOL/L — SIGNIFICANT CHANGE UP (ref 135–145)
WBC # BLD: 6.82 K/UL — SIGNIFICANT CHANGE UP (ref 3.8–10.5)
WBC # FLD AUTO: 6.82 K/UL — SIGNIFICANT CHANGE UP (ref 3.8–10.5)

## 2023-02-27 PROCEDURE — 73564 X-RAY EXAM KNEE 4 OR MORE: CPT | Mod: 26,RT

## 2023-02-27 PROCEDURE — 85025 COMPLETE CBC W/AUTO DIFF WBC: CPT

## 2023-02-27 PROCEDURE — 90471 IMMUNIZATION ADMIN: CPT

## 2023-02-27 PROCEDURE — 72125 CT NECK SPINE W/O DYE: CPT | Mod: MA

## 2023-02-27 PROCEDURE — 87637 SARSCOV2&INF A&B&RSV AMP PRB: CPT

## 2023-02-27 PROCEDURE — 71045 X-RAY EXAM CHEST 1 VIEW: CPT | Mod: 26

## 2023-02-27 PROCEDURE — 70450 CT HEAD/BRAIN W/O DYE: CPT | Mod: 26,MA

## 2023-02-27 PROCEDURE — 90715 TDAP VACCINE 7 YRS/> IM: CPT

## 2023-02-27 PROCEDURE — 36415 COLL VENOUS BLD VENIPUNCTURE: CPT

## 2023-02-27 PROCEDURE — 71045 X-RAY EXAM CHEST 1 VIEW: CPT

## 2023-02-27 PROCEDURE — 72125 CT NECK SPINE W/O DYE: CPT | Mod: 26,MA

## 2023-02-27 PROCEDURE — 99284 EMERGENCY DEPT VISIT MOD MDM: CPT | Mod: GC

## 2023-02-27 PROCEDURE — 80053 COMPREHEN METABOLIC PANEL: CPT

## 2023-02-27 PROCEDURE — 73564 X-RAY EXAM KNEE 4 OR MORE: CPT

## 2023-02-27 PROCEDURE — 70450 CT HEAD/BRAIN W/O DYE: CPT | Mod: MA

## 2023-02-27 PROCEDURE — 99284 EMERGENCY DEPT VISIT MOD MDM: CPT | Mod: 25

## 2023-02-27 RX ORDER — TETANUS TOXOID, REDUCED DIPHTHERIA TOXOID AND ACELLULAR PERTUSSIS VACCINE, ADSORBED 5; 2.5; 8; 8; 2.5 [IU]/.5ML; [IU]/.5ML; UG/.5ML; UG/.5ML; UG/.5ML
0.5 SUSPENSION INTRAMUSCULAR ONCE
Refills: 0 | Status: COMPLETED | OUTPATIENT
Start: 2023-02-27 | End: 2023-02-27

## 2023-02-27 RX ORDER — ACETAMINOPHEN 500 MG
650 TABLET ORAL ONCE
Refills: 0 | Status: COMPLETED | OUTPATIENT
Start: 2023-02-27 | End: 2023-02-27

## 2023-02-27 RX ADMIN — TETANUS TOXOID, REDUCED DIPHTHERIA TOXOID AND ACELLULAR PERTUSSIS VACCINE, ADSORBED 0.5 MILLILITER(S): 5; 2.5; 8; 8; 2.5 SUSPENSION INTRAMUSCULAR at 03:38

## 2023-02-27 RX ADMIN — Medication 650 MILLIGRAM(S): at 03:40

## 2023-02-27 NOTE — ED ADULT NURSE NOTE - OBJECTIVE STATEMENT
Pt is a 89y M [employer] s/p mechanical trip and fall. Pt was walking to the bathroom, slipped and fell. P/w R forehead abrasion, abrasion to b/l knees, L forearm (from previous injury), and L scapula. Pt with intact ROM all extremities. No LOC, no AC use. Denies dizziness, chest pain, SOB, syncope. A&Ox4, WOMACK, lungs clear, distal pulses intact, abdomen soft, skin with aforementioned abrasions, otherwise intact.. Side rails up for safety, call bell and personal items within reach, instructed to call for assistance, verbalizes understanding. Will continue to monitor. Pt is a 89y M [employer] s/p mechanical trip and fall. Pt was walking to the bathroom, slipped and fell. P/w R forehead abrasion, abrasion to b/l knees, R shoulder, L forearm (from previous injury), and L scapula. Pt with intact ROM all extremities. No LOC, no AC use. Denies dizziness, chest pain, SOB, syncope. A&Ox4, WOMACK, lungs clear, distal pulses intact, abdomen soft, skin with aforementioned abrasions, otherwise intact.. Side rails up for safety, call bell and personal items within reach, instructed to call for assistance, verbalizes understanding. Will continue to monitor.

## 2023-02-27 NOTE — ED PROVIDER NOTE - PHYSICAL EXAMINATION
CONSTITUTIONAL: NAD  SKIN: 4cm abrasion over R forehead with minimal ooze, b/l knee abrasions, minor L knee abrasion 2cm, more significant R knee abrasions 3cm in size  HEAD: NCAT  EYES: EOMI, PERRLA, no scleral icterus, conjunctiva pink  ENT: normal pharynx with no erythema or exudates  NECK: Supple; non tender. Full ROM.  CARD: RRR, no murmurs.  RESP: clear to ausculation b/l. No crackles or wheezing.  ABD: soft, non-tender, non-distended, no rebound or guarding.  MSK: Full ROM of all joints, r knee tenderness, no pelvic or hip tenderness or instability, full ue rom active and passive, no neck pain, no back pain or injury.   PSYCH: Cooperative, appropriate a/ox3 CONSTITUTIONAL: NAD  SKIN: 4cm abrasion over R forehead with minimal ooze, b/l knee abrasions, minor L knee abrasion 2cm, more significant R knee abrasions 3cm in size  HEAD: NCAT  EYES: EOMI, PERRLA, no scleral icterus, conjunctiva pink  ENT: normal pharynx with no erythema or exudates  NECK: Supple; non tender. Full ROM.  CARD: RRR, no murmurs.  RESP: clear to ausculation b/l. No crackles or wheezing.  ABD: soft, non-tender, non-distended, no rebound or guarding.  MSK: Full ROM of all joints, r knee tenderness, no pelvic or hip tenderness or instability, full ue rom active and passive, no neck pain, no back pain or injury.   PSYCH: Cooperative, appropriate a/ox3  Attending Shira Sommers: Gen: NAD, heent: abrasion to right forekead, eomi, perrla, mmm,  neck; nttp, no nuchal rigidity, chest: nttp, no crepitus, cv: rrr, +murmur  lungs: ctab, abd: soft, nontender, nondistended, no peritoneal signs, no guarding, ext: wwp, abrasions to right knee, mild abrasion to left knee, able to range, no tibial platuea ttp, pelvis stable neuro: awake and alert, following commands, speech clear, sensation and strength intact, no focal deficits

## 2023-02-27 NOTE — ED PROVIDER NOTE - PROGRESS NOTE DETAILS
Attending Shira Sommers: cxr shows large cardiacsillhoute. on review of prior chart and imaging pt with h/o mediastinal mass. no sob or chest pain. pt s/p mechanical fall. will give pt results Attending Shira Sommers: cxr shows large cardiac sillhoute. on review of prior chart and imaging pt with h/o mediastinal mass. no sob or chest pain. pt s/p mechanical fall. will give pt results Roacel Adhikari, DO PGY-3: No acute findings on CT, XR, pt bandaged, spoke wTyson jacobo who had mediastinal mass on patient's history, will dc pt back to [facility]

## 2023-02-27 NOTE — ED PROVIDER NOTE - OBJECTIVE STATEMENT
89 yom pmh dementia, htn, hld HLD, hx goiter s/p resection on tsh presents to the ed for a fall, pt a/ox3 at this time, states there is a gap in the furniture while walking to the bathroom and fell, pt denies any LOC, pt now complains of headache and R leg pain. Last tetanus vaccination last year. Pt denies any symptoms other than pain at this time, denies any weakness , lightheadedness, or malaise.

## 2023-02-27 NOTE — ED PROVIDER NOTE - ATTENDING CONTRIBUTION TO CARE
Attending MD Shira Sommers:  I personally have seen and examined this patient.  Resident note reviewed and agree on plan of care and except where noted.  See HPI, PE, and MDM for details.

## 2023-02-27 NOTE — ED PROVIDER NOTE - CLINICAL SUMMARY MEDICAL DECISION MAKING FREE TEXT BOX
Unwitnessed fall w/. hx of dementia, no AC use but w/ medical hx will get labs, ua, xrays, ct head and neck to r/o acute fracture or ich, low suspicion for infectious/metabolic cause of fall given mechanical story however w/ pt's history will check labs and urine. If cts, xrs, lab work negative, ok for dc back to [facility] Unwitnessed fall w/. hx of dementia, no AC use but w/ medical hx will get labs, ua, xrays, ct head and neck to r/o acute fracture or ich, low suspicion for infectious/metabolic cause of fall given mechanical story however w/ pt's history will check labs and urine. If cts, xrs, lab work negative, ok for dc back to [facility]  Attending Shira Sommers: 89 yo male presenting after fall. pt able to recall how he fell and history consistent with mechanical injury. upon arrival pt awake and alert. primary survey intact. on secondary survey pt with abrasions with hematoma to right forehead and abrasion to knee. able to range knee. no ttp tibial plateau. no neck pain on exam. no focal neurl.ogic deficit. pt awake and following commands. denies any h/o anticoagulation use. no chest wall ttp and no evidence of respiratory distress making rib fractures less likely. will obtain labs, ct head, xray and re-eval.

## 2023-02-27 NOTE — ED PROVIDER NOTE - NSFOLLOWUPINSTRUCTIONS_ED_ALL_ED_FT
You were seen in the Emergency Department for a fall.    If you have fever, chills, nausea, vomiting, new or worsening pain, or if you have any new symptoms return to the Emergency Department.

## 2023-02-27 NOTE — ED PROVIDER NOTE - PATIENT PORTAL LINK FT
You can access the FollowMyHealth Patient Portal offered by [facility] by registering at the following website: [email or web address]. By joining [employer]’s FollowMyHealth portal, you will also be able to view your health information using other applications (apps) compatible with our system.

## 2024-07-21 NOTE — H&P ADULT - HISTORY OF PRESENT ILLNESS
89M w/ PMHx of HTN, ?CAD, HLD, goiter s/p resection now on thyroid replacement presents from the [facility] after a fall. Patient reports yesterday evening he was getting up from the bathroom, slipped and fell. +headstrike but denies LOC. He also co hip pain. States he was down ~1hr before he activated his life alert and was able to summon help. He was brought via EMS to hospital for further eval. 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 Additionally ECG showing new onset afib. Patient denies cp, shortness of breath or palpitations. Has had prior falls. Normally ambulates w/ walker. No fever/chills/dysuria.  Called patient to discuss abnormal rise in hCG. She reports recent onset of right-sided pain. Given this, I recommend that she proceed to the ER now for evaluation of possible ectopic pregnancy. She agrees.     Yamil Schmid MD  7/21/2024 6:12 PM     89M w/ PMHx of HTN, ?CAD, HLD, goiter s/p resection now on thyroid replacement presents from the [facility] after a fall. Patient reports yesterday evening he was getting up from the bathroom, slipped and fell. +headstrike but denies LOC. He also co hip pain. States he was down ~1hr before he activated his life alert and was able to summon help. He was brought via EMS to hospital for further eval. 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 Additionally ECG showing new onset afib. Patient denies cp, shortness of breath or palpitations. Has had prior falls. Normally ambulates w/ walker. No fever/chills/dysuria.     Of note, daughter reports pt had history of goiter (benign) which was resected ~15 yrs ago. Follows with [facility]. Thinks that chest mass may already be known. Regardless, does not want to pursue any invasive biopsy or workup while inpatient.